# Patient Record
Sex: FEMALE | Race: WHITE | NOT HISPANIC OR LATINO | Employment: OTHER | URBAN - METROPOLITAN AREA
[De-identification: names, ages, dates, MRNs, and addresses within clinical notes are randomized per-mention and may not be internally consistent; named-entity substitution may affect disease eponyms.]

---

## 2017-06-24 ENCOUNTER — COMMUNICATION - HEALTHEAST (OUTPATIENT)
Dept: FAMILY MEDICINE | Facility: CLINIC | Age: 37
End: 2017-06-24

## 2017-06-24 DIAGNOSIS — B00.9 HERPES SIMPLEX WITHOUT MENTION OF COMPLICATION: ICD-10-CM

## 2017-11-02 ENCOUNTER — OFFICE VISIT - HEALTHEAST (OUTPATIENT)
Dept: FAMILY MEDICINE | Facility: CLINIC | Age: 37
End: 2017-11-02

## 2017-11-02 DIAGNOSIS — Z01.810 PREOP CARDIOVASCULAR EXAM: ICD-10-CM

## 2017-11-02 ASSESSMENT — MIFFLIN-ST. JEOR: SCORE: 1250.56

## 2017-12-17 ENCOUNTER — COMMUNICATION - HEALTHEAST (OUTPATIENT)
Dept: FAMILY MEDICINE | Facility: CLINIC | Age: 37
End: 2017-12-17

## 2017-12-17 DIAGNOSIS — B00.9 HERPES SIMPLEX VIRUS (HSV) INFECTION: ICD-10-CM

## 2018-01-31 ENCOUNTER — OFFICE VISIT - HEALTHEAST (OUTPATIENT)
Dept: FAMILY MEDICINE | Facility: CLINIC | Age: 38
End: 2018-01-31

## 2018-01-31 DIAGNOSIS — Z00.00 HEALTH CARE MAINTENANCE: ICD-10-CM

## 2018-01-31 DIAGNOSIS — B00.9 HERPES SIMPLEX VIRUS (HSV) INFECTION: ICD-10-CM

## 2018-01-31 LAB
CHOLEST SERPL-MCNC: 174 MG/DL
FASTING STATUS PATIENT QL REPORTED: YES
HDLC SERPL-MCNC: 74 MG/DL
LDLC SERPL CALC-MCNC: 89 MG/DL
TRIGL SERPL-MCNC: 56 MG/DL

## 2018-01-31 ASSESSMENT — MIFFLIN-ST. JEOR: SCORE: 1277.77

## 2018-02-01 LAB
HPV SOURCE: NORMAL
HUMAN PAPILLOMA VIRUS 16 DNA: NEGATIVE
HUMAN PAPILLOMA VIRUS 18 DNA: NEGATIVE
HUMAN PAPILLOMA VIRUS FINAL DIAGNOSIS: NORMAL
HUMAN PAPILLOMA VIRUS OTHER HR: NEGATIVE
SPECIMEN DESCRIPTION: NORMAL

## 2019-08-16 ENCOUNTER — COMMUNICATION - HEALTHEAST (OUTPATIENT)
Dept: FAMILY MEDICINE | Facility: CLINIC | Age: 39
End: 2019-08-16

## 2019-08-16 DIAGNOSIS — B00.9 HERPES SIMPLEX VIRUS (HSV) INFECTION: ICD-10-CM

## 2019-09-26 ENCOUNTER — OFFICE VISIT - HEALTHEAST (OUTPATIENT)
Dept: FAMILY MEDICINE | Facility: CLINIC | Age: 39
End: 2019-09-26

## 2019-09-26 DIAGNOSIS — B00.9 HERPES SIMPLEX VIRUS (HSV) INFECTION: ICD-10-CM

## 2019-09-26 DIAGNOSIS — K60.2 RECTAL FISSURE: ICD-10-CM

## 2019-09-26 DIAGNOSIS — Z00.00 ANNUAL PHYSICAL EXAM: ICD-10-CM

## 2019-09-26 DIAGNOSIS — N84.1 CERVICAL POLYP: ICD-10-CM

## 2019-09-26 DIAGNOSIS — N92.0 EXCESSIVE AND FREQUENT MENSTRUATION: ICD-10-CM

## 2019-09-26 LAB
ALBUMIN SERPL-MCNC: 4.5 G/DL (ref 3.5–5)
ALP SERPL-CCNC: 61 U/L (ref 45–120)
ALT SERPL W P-5'-P-CCNC: 12 U/L (ref 0–45)
ANION GAP SERPL CALCULATED.3IONS-SCNC: 9 MMOL/L (ref 5–18)
AST SERPL W P-5'-P-CCNC: 17 U/L (ref 0–40)
BASOPHILS # BLD AUTO: 0 THOU/UL (ref 0–0.2)
BASOPHILS NFR BLD AUTO: 0 % (ref 0–2)
BILIRUB SERPL-MCNC: 1.1 MG/DL (ref 0–1)
BUN SERPL-MCNC: 12 MG/DL (ref 8–22)
CALCIUM SERPL-MCNC: 9.9 MG/DL (ref 8.5–10.5)
CHLORIDE BLD-SCNC: 103 MMOL/L (ref 98–107)
CLUE CELLS: NORMAL
CO2 SERPL-SCNC: 28 MMOL/L (ref 22–31)
CREAT SERPL-MCNC: 0.87 MG/DL (ref 0.6–1.1)
EOSINOPHIL # BLD AUTO: 0.1 THOU/UL (ref 0–0.4)
EOSINOPHIL NFR BLD AUTO: 1 % (ref 0–6)
ERYTHROCYTE [DISTWIDTH] IN BLOOD BY AUTOMATED COUNT: 11.8 % (ref 11–14.5)
GFR SERPL CREATININE-BSD FRML MDRD: >60 ML/MIN/1.73M2
GLUCOSE BLD-MCNC: 98 MG/DL (ref 70–125)
HCT VFR BLD AUTO: 38.9 % (ref 35–47)
HGB BLD-MCNC: 13.1 G/DL (ref 12–16)
LH SERPL-ACNC: 9.5 MIU/ML
LYMPHOCYTES # BLD AUTO: 1.8 THOU/UL (ref 0.8–4.4)
LYMPHOCYTES NFR BLD AUTO: 27 % (ref 20–40)
MCH RBC QN AUTO: 31 PG (ref 27–34)
MCHC RBC AUTO-ENTMCNC: 33.6 G/DL (ref 32–36)
MCV RBC AUTO: 92 FL (ref 80–100)
MONOCYTES # BLD AUTO: 0.3 THOU/UL (ref 0–0.9)
MONOCYTES NFR BLD AUTO: 5 % (ref 2–10)
NEUTROPHILS # BLD AUTO: 4.3 THOU/UL (ref 2–7.7)
NEUTROPHILS NFR BLD AUTO: 67 % (ref 50–70)
PLATELET # BLD AUTO: 232 THOU/UL (ref 140–440)
PMV BLD AUTO: 8.1 FL (ref 7–10)
POTASSIUM BLD-SCNC: 3.9 MMOL/L (ref 3.5–5)
PROLACTIN SERPL-MCNC: 5.7 NG/ML (ref 0–20)
PROT SERPL-MCNC: 8 G/DL (ref 6–8)
RBC # BLD AUTO: 4.22 MILL/UL (ref 3.8–5.4)
SODIUM SERPL-SCNC: 140 MMOL/L (ref 136–145)
TRICHOMONAS, WET PREP: NORMAL
TSH SERPL DL<=0.005 MIU/L-ACNC: 0.85 UIU/ML (ref 0.3–5)
WBC: 6.5 THOU/UL (ref 4–11)
YEAST, WET PREP: NORMAL

## 2019-09-26 ASSESSMENT — MIFFLIN-ST. JEOR: SCORE: 1272.62

## 2019-09-27 LAB
C TRACH DNA SPEC QL PROBE+SIG AMP: NEGATIVE
N GONORRHOEA DNA SPEC QL NAA+PROBE: NEGATIVE

## 2019-10-01 ENCOUNTER — RECORDS - HEALTHEAST (OUTPATIENT)
Dept: ADMINISTRATIVE | Facility: OTHER | Age: 39
End: 2019-10-01
Payer: COMMERCIAL

## 2020-01-15 ENCOUNTER — HOSPITAL ENCOUNTER (OUTPATIENT)
Dept: MAMMOGRAPHY | Facility: CLINIC | Age: 40
Discharge: HOME | End: 2020-01-15

## 2020-01-15 DIAGNOSIS — Z00.00 ANNUAL PHYSICAL EXAM: ICD-10-CM

## 2020-12-14 ENCOUNTER — OFFICE VISIT - HEALTHEAST (OUTPATIENT)
Dept: FAMILY MEDICINE | Facility: CLINIC | Age: 40
End: 2020-12-14

## 2020-12-14 ENCOUNTER — COMMUNICATION - HEALTHEAST (OUTPATIENT)
Dept: SCHEDULING | Facility: CLINIC | Age: 40
End: 2020-12-14

## 2020-12-14 DIAGNOSIS — K13.79 MOUTH PAIN: ICD-10-CM

## 2020-12-14 DIAGNOSIS — J02.9 PHARYNGITIS, UNSPECIFIED ETIOLOGY: ICD-10-CM

## 2020-12-14 LAB
DEPRECATED S PYO AG THROAT QL EIA: NORMAL
GROUP A STREP BY PCR: NORMAL

## 2020-12-14 RX ORDER — NYSTATIN 100000/ML
SUSPENSION, ORAL (FINAL DOSE FORM) ORAL
Qty: 200 ML | Refills: 0 | Status: SHIPPED | OUTPATIENT
Start: 2020-12-14 | End: 2022-08-17

## 2020-12-16 ENCOUNTER — COMMUNICATION - HEALTHEAST (OUTPATIENT)
Dept: SCHEDULING | Facility: CLINIC | Age: 40
End: 2020-12-16

## 2021-03-02 ENCOUNTER — HOSPITAL ENCOUNTER (OUTPATIENT)
Dept: MAMMOGRAPHY | Facility: CLINIC | Age: 41
Discharge: HOME | End: 2021-03-02

## 2021-03-02 DIAGNOSIS — Z12.31 SCREENING MAMMOGRAM, ENCOUNTER FOR: ICD-10-CM

## 2021-05-19 ENCOUNTER — COMMUNICATION - HEALTHEAST (OUTPATIENT)
Dept: FAMILY MEDICINE | Facility: CLINIC | Age: 41
End: 2021-05-19

## 2021-05-19 DIAGNOSIS — B00.9 HERPES SIMPLEX VIRUS (HSV) INFECTION: ICD-10-CM

## 2021-05-20 RX ORDER — ACYCLOVIR 400 MG/1
TABLET ORAL
Qty: 15 TABLET | Refills: 6 | Status: SHIPPED | OUTPATIENT
Start: 2021-05-20

## 2021-05-30 ENCOUNTER — HEALTH MAINTENANCE LETTER (OUTPATIENT)
Age: 41
End: 2021-05-30

## 2021-05-31 VITALS — BODY MASS INDEX: 21.66 KG/M2 | WEIGHT: 130 LBS | HEIGHT: 65 IN

## 2021-05-31 VITALS — WEIGHT: 136 LBS | HEIGHT: 65 IN | BODY MASS INDEX: 22.66 KG/M2

## 2021-05-31 NOTE — TELEPHONE ENCOUNTER
Refill Request  Did you contact pharmacy: No  Medication name:   Requested Prescriptions     Pending Prescriptions Disp Refills     acyclovir (ZOVIRAX) 400 MG tablet 15 tablet 1     Sig: TAKE 1 TABLET BY MOUTH THREE TIMES DAILY     Who prescribed the medication: Skylar Nicholas MD    Pharmacy Name and Location: [facility] # [id number]  Is patient out of medication: Yes  Patient notified refills processed in 72 hours:  yes  Okay to leave a detailed message: yes    Patient is having an outbreak and would appreciate this be review today. Patient was informed is past due for office visit. Patient will call back once has schedule.

## 2021-06-01 NOTE — PROGRESS NOTES
FEMALE ADULT PREVENTIVE EXAM    ASSESSMENT:   Iza Pichardo  was seen today for annual exam.  1. Annual physical exam  Mammo Screening Bilateral    Influenza,Seasonal,Quad,INJ =/>6months   2. Herpes simplex virus (HSV) infection  acyclovir (ZOVIRAX) 400 MG tablet   3. Excessive and frequent menstruation  Comprehensive Metabolic Panel    HM1(CBC and Differential)    Thyroid Cascade    Luteinizing Hormone (LH)    Prolactin    HM1 (CBC with Diff)    Chlamydia trachomatis & Neisseria gonorrhoeae, Amplified Detection    Wet Prep, Vaginal    CANCELED: US Pelvis With Transvaginal Non OB   4. Cervical polyp  Ambulatory referral to Obstetrics / Gynecology   5. Rectal fissure         PLAN:   Discussed work-up for cervical polyp.  Discussed careful anal fissure and further work-up if it is persisting.  Continue current healthy lifestyle patterns and return for routine annual checkups      CHIEF COMPLAINT:      Chief Complaint   Patient presents with     Establish Care     Pt here today to establish care with a new provider- former Pt of Dr Nicholas     Annual Exam     Not Fasting, Last Pap 1/31/18         SUBJECTIVE:  Iza Pichardo is a 39 y.o. female   presents today for an annual physical examination.     Patient reports frequent menstruation.  She said her cycles are now every 3 weeks or sometimes even less.  The first day of the.  Is always heavy.  She denies excessive cramping or abdominal pain.  LMP Just finished. 2 days 09/19/2019, 09/02/2019 every 2 -3 weeks for last 6 months No intermentrual spotting. Fiirst day is heavy with some small clots.    She would also like to be evaluated for what she feels is a sore on the anal area.  This sore has been present for couple of weeks.  She does have a history of HSV with perhaps one flare up once a year.      Iza Pichardo  has a past medical history  has a past medical history of Inverted nipple (all life.).        Surgeries:      Past Surgical History:    Procedure Laterality Date     BREAST SURGERY  11/2017    bilateral augmentation; silicone     NOSE SURGERY      cosmetic     IA CORRJ HALLUX VALGUS W/SESMDC W/1METAR MEDIAL CNF      Description: Bunion Correction By Lapidus Procedure;  Proc Date: 04/05/2013;  Comments: R Lapidus bunionectomy, [employer] hardware     IA CORRJ HALLUX VALGUS W/SESMDC W/DIST METAR OSTEOT      Description: Bunion Correction With Metatarsal Osteotomy;  Recorded: 03/27/2013;  Comments: '12 (left)         Family History:  family history includes Brain cancer in her mother; Breast cancer in her paternal grandmother; Breast cancer (age of onset: 50) in her paternal aunt; Depression in her sister; Drug abuse in her sister.    Social History:   reports that she has never smoked. She has never used smokeless tobacco.    Medications:    Current Outpatient Medications on File Prior to Visit   Medication Sig Dispense Refill     calcium 500 mg Tab Take 500 mg by mouth.       [DISCONTINUED] acyclovir (ZOVIRAX) 400 MG tablet TAKE 1 TABLET BY MOUTH THREE TIMES DAILY 15 tablet 1     No current facility-administered medications on file prior to visit.          Allergies:    Allergies   Allergen Reactions     Amoxicillin Rash     Augmentin [Amoxicillin-Pot Clavulanate] Nausea And Vomiting         HEALTH MAINTENANCE:  Pap- 2018 NORMAL  Mammography: 2015 NORMAL    Healthy Habits:   Regular Exercise: No  Sunscreen Use: Yes  Healthy Diet: Yes  Dental Visits Regularly: No  Seat Belt: Yes  Sexually active: Yes  Self Breast Exam Monthly:Yes  Hemoccults: No  Flex Sig: No  Colonoscopy: No  Lipid Profile: Yes  Glucose Screen: Yes  Prevention of Osteoporosis: Yes  Last Dexa: N/A  Guns at Home:  No      REVIEW OF SYSTEMS:  Complete head to toe review of systems is otherwise negative except as above.  Iza SHI Charter denies any fever, cough, loss of appetite, heart issues, GI issues, new skin lesions, endocrine issues.  She informs me she feels  "well.      OBJECTIVE:  VITAL SIGNS: /75 (Patient Site: Left Arm, Patient Position: Sitting, Cuff Size: Adult Regular)   Pulse 78   Resp 16   Ht 5' 4.96\" (1.65 m)   Wt 135 lb (61.2 kg)   LMP 09/21/2019 (Approximate)   SpO2 100%   BMI 22.49 kg/m      GENERAL:  Patient alert, in no acute distress.  EYES: PERRLA. Extraoccular movements intact, pupils equal, reactive to light and accommodation.  Normal conjunctiva and lids.  ENT:  Hearing grossly normal.  Normal appearance to ears and nose.  Bilateral TM s, external canals, oropharynx normal. Normal lips, gums and teeth.   NECK:  Supple, without thyromegaly or mass.  RESP:  Clear to auscultation without crackles, wheezes or distress.  Normal respiratory effort.   CV:  Regular rate and rhythm without murmurs, rubs or gallops.  Normal carotid, abdominal aorta, femoral and pedal pulses.  No varicosities or edema.  ABDOMEN:  Soft, non-tender, without hepatosplenomegaly, masses, or hernias.   BREASTS:  Nontender, without masses, nipple discharge, erythema, or axillary adenopathy.    PELVIC EXAMINATION:  EXT GEN: No lesions.   PERINEUM: Intact. No perineal or external anal lesions.  URETHRA: No meatal lesions, prolapse. No urethral          tenderness or masses  BLADDER: Nontender, no masses  INTROITUS: Well supported. No lesions or other abnormalities  VAGINA: Clean, no bleeding  CERVIX: Normal appearing epithelium. Parous.  Cervical polyp noted at the os.  UTERUS: Nl in size, shape and consistency   ADNEXA: Clear, non tender  Rectal area: There is a anal fissure noted at the o'clock position.  LYMPHATIC: Normal palpation of neck, and axilla..  No lymphadenopathy.   NEURO:   motor & sensory function all intact.  DTR and reflexes normal.  PSYCHIATRIC:  Alert & oriented with normal mood and affect.  Good judgment and insight.  SKIN:  Normal inspection and palpation.  MUSCULOSKELETAL: Normal gait and station.      "

## 2021-06-02 ENCOUNTER — RECORDS - HEALTHEAST (OUTPATIENT)
Dept: ADMINISTRATIVE | Facility: CLINIC | Age: 41
End: 2021-06-02

## 2021-06-03 VITALS
WEIGHT: 135 LBS | RESPIRATION RATE: 16 BRPM | HEIGHT: 65 IN | OXYGEN SATURATION: 100 % | BODY MASS INDEX: 22.49 KG/M2 | DIASTOLIC BLOOD PRESSURE: 75 MMHG | HEART RATE: 78 BPM | SYSTOLIC BLOOD PRESSURE: 114 MMHG

## 2021-06-05 VITALS
BODY MASS INDEX: 23.92 KG/M2 | SYSTOLIC BLOOD PRESSURE: 116 MMHG | WEIGHT: 143.6 LBS | RESPIRATION RATE: 16 BRPM | HEART RATE: 77 BPM | TEMPERATURE: 98.5 F | DIASTOLIC BLOOD PRESSURE: 77 MMHG

## 2021-06-13 NOTE — TELEPHONE ENCOUNTER
Tongue slightly swollen and has ridges on it  And is sore/no known injury/ last week had a sore throat that went away/ 48 hours ago lips were numb/ but that went away/ using a new tooth paste and had deep pocket cleaning 11/28/ sent for an appointment or will go to the walk in.  NOÉ Ching    Reason for Disposition    [1] Swelling of tongue is a recurrent problem AND [2] no swelling at present    Additional Information    Negative: Started suddenly after sting from bee, wasp, or yellow jacket    Negative: Started suddenly after taking a medicine or allergic food (e.g., nuts)    Negative: Wheezing, stridor, hoarseness, or difficulty breathing    Negative: Facial swelling    Negative: Neck swelling    Negative: Can't swallow normal secretions (e.g., drooling or spitting)    Negative: Taking an ACE Inhibitor medication  (e.g., benazepril/LOTENSIN, captopril/CAPOTEN, enalapril/VASOTEC, lisinopril/ZESTRIL)    Negative: Sounds like a life-threatening emergency to the triager    Negative: Followed a tongue injury    Negative: Pain in tongue, mouth, or tooth    Negative: All other adults with swollen tongue   (Exception: tongue swelling is a recurrent problem AND NO swelling at present)    Protocols used: TONGUE SWELLING-A-

## 2021-06-13 NOTE — PROGRESS NOTES
Assessment:     Iza Pichardo was seen in preoperative consultation in preparation for rhinoplasty and breast augmentation. This is an intermediate risk surgery and the patient has no increased risk for major cardiac complications based on exam and history and appears to be medically stable for the proposed procedure.      37 y.o. female with planned surgery as above.    Known risk factors for perioperative complications: None    Difficulty with intubation is not anticipated.    Cardiac Risk Estimation: low risk.       Current medications which may produce withdrawal symptoms if withheld perioperatively: none       Plan:      1. Preoperative workup as follows hemoglobin, hematocrit, beta HCG.  2. Change in medication regimen before surgery: discontinue supplements 7 days prior to procedure. .  3. Prophylaxis for cardiac events with perioperative beta-blockers: not indicated.  4. Invasive hemodynamic monitoring perioperatively: not indicated.  5. Deep vein thrombosis prophylaxis postoperatively:regimen to be chosen by surgical team.  6. Surveillance for postoperative MI with ECG immediately postoperatively and on postoperative days 1 and 2 AND troponin levels 24 hours postoperatively and on day 4 or hospital discharge (whichever comes first): not indicated.  7. Other measures: none      Subjective:      Iza Pichardo is a 37 y.o. female who presents to the office today for a preoperative consultation at the request of surgeon Dr. Pope who plans on performing rhinoplasty and breast augmentation on November 8. This consultation is requested for the specific conditions prompting preoperative evaluation (i.e. because of potential affect on operative risk):none. Planned anesthesia: general. The patient has the following known anesthesia issues: nonwe. Patients bleeding risk: no recent abnormal bleeding and no remote history of abnormal bleeding. Patient does not have objections to receiving blood products if  needed.    History of present illness: Patient here for preoperative evaluation for breast augmentation rhinoplasty.  Has had a previous breast augmentation several years ago.  This went well.  She is a little bit apprehensive about surgery, however looking forward to it.  No history of cardiac problems.  Has tolerated previous procedures with anesthesia well and without reaction.  No bleeding disorders that she is aware of in herself or her family members.  No recent illness, fever, chills, shortness of breath, palpitations or chest pain.    The following portions of the patient's history were reviewed and updated as appropriate: allergies, current medications, past family history, past medical history, past social history, past surgical history and problem list.    Review of Systems  A 12 point comprehensive review of systems was negative except as noted.      Objective:     There were no vitals taken for this visit.  General: Patient appears to be in no acute distress.  Eyes: Inferior palpebral conjunctiva clear and pink, no exudates or tearing. Sclera are white. Eyelids symmetrical, no erythema, flakiness, fasciculations, or ptosis. Pupils react equally to Light and accommodation. EOMS intact. No lid lag, no nystagmus, corneal reflex equal bilaterally, cornea and lens clear, red reflex present, optic disc cream colored with well defined borders. Retina pink, no hemorrhages or exudates.  Ears: No nodules, lesions, masses, discharge or tenderness in auricles or mastoid area. No cerumen in ear canals. Tympanic membranes pearly gray, normal bony landmarks and cone of light bilaterally, no bulges or perforations.   Oropharynx: Buccal mucosa pink, moist, no lesions. Tongue midline, spongy, pink. Uvula midline. Pharynx with no erythema, no exudates. Tonsils + 1.  Neck: Full range of motion, no pain with palpation of spine or paraspinal muscles.  Lungs: Unlabored. clear breath sounds heard throughout lung fields.    Heart: Regular rate and rhythm.  Abdomen: Soft rounded abdomen, no surgical scars. Bowel sounds heard in all four quadrants; liver, spleen, and kidney not palpable, no tenderness on palpation of  abdomen, no CVA tenderness.    Musculoskeletal: muscles appear symmetric, muscle strength appropriate (Bilateral upper and lower extremities strength 5/5) and equal bilaterally full range of active and passive motion, spine and extremities in good alignment.  Neuro: Coordinated, smooth gait, balance, rapid alternating movements, sensory functioning, and cranial nerves II-XII grossly intact. DTR's+2 bilaterally brachioradialis, knee, ankle. Rhomberg s normal.        Predictors of intubation difficulty:   Morbid obesity? no   Anatomically abnormal facies? no   Prominent incisors? no   Receding mandible? no   Short, thick neck? no   Neck range of motion: normal   Mallampati score: I (soft palate, uvula, fauces, and tonsillar pillars visible)   Dentition: No chipped, loose, or missing teeth.    Cardiographics  ECG: no prior ECG  Echocardiogram: not done    Imaging  Chest x-ray: Not done     Lab Review   Lab Results   Component Value Date     03/27/2013    K 4.1 03/27/2013     03/27/2013    CO2 24 03/27/2013    BUN 13 03/27/2013    CREATININE 0.91 03/27/2013    CALCIUM 9.4 03/27/2013     Lab Results   Component Value Date    WBC 5.5 11/16/2015    HGB 12.9 11/02/2017    HCT 38.4 11/02/2017    MCV 94 11/16/2015     11/16/2015

## 2021-06-13 NOTE — PROGRESS NOTES
Assessment/ Plan     1. Pharyngitis, unspecified etiology  2. Mouth pain  Rule out strep.  Rule out COVID-19 infection  Etiology unclear.  Consider possible thrush versus other etiology    Rapid strep test is negative  Refer for a Covid 19 test  Recommend empiric treatment with nystatin suspension in the short-term  However, favor follow-up with an ENT specialist if having ongoing symptoms    - Symptomatic COVID-19 Virus (CORONAVIRUS) PCR; Future  - Rapid Strep A Screen- Throat Swab  - Symptomatic COVID-19 Virus (CORONAVIRUS) PCR  - Group A Strep PCR Throat Swab  - nystatin (MYCOSTATIN) 100,000 unit/mL suspension; Use 5 mls PO four times a day. Swish and spit  Dispense: 200 mL; Refill: 0        Subjective:       Iza Pichardo is a 40 y.o. female, patient of Dr. Crowe, who presents to the clinic as she has had a constellation of symptoms over the past several days.  She reports a sore throat and has had some discomfort involving her tongue.  She states that her lips actually feel numb.  She has had a coating on her tongue and cheeks as well.  She has not had this before.  Her sense of taste is diminished.  Her throat has been sore.  She cannot think of any unusual exposures recently.  She denies fever, chest pain, respiratory concerns.    The following portions of the patient's history were reviewed and updated as appropriate: allergies, current medications, past family history, past medical history, past social history, past surgical history and problem list. Medications have been reconciled    Review of Systems   A 12 point comprehensive review of systems was negative except as noted.      Current Outpatient Medications   Medication Sig Dispense Refill     acyclovir (ZOVIRAX) 400 MG tablet TAKE 1 TABLET BY MOUTH THREE TIMES DAILY 15 tablet 6     calcium 500 mg Tab Take 500 mg by mouth.       nystatin (MYCOSTATIN) 100,000 unit/mL suspension Use 5 mls PO four times a day. Swish and spit 200 mL 0     No  current facility-administered medications for this visit.        Objective:      /77   Pulse 77   Temp 98.5  F (36.9  C) (Oral)   Resp 16   Wt 143 lb 9.6 oz (65.1 kg)   BMI 23.92 kg/m      General appearance: alert, appears stated age   Head: normocephalic, without obvious abnormality, atraumatic  Eyes: conjunctivae/corneas clear. PERRL, EOM's intact.   Ears: normal TM's and external ear canals both ears  Nose: nares normal. Septum midline. Mucosa normal.  Throat: lips, mucosa, and tongue normal; teeth and gums normal  Oropharynx is mildly erythematous  There is a whitish coating on the tongue and this involves the cheeks  Neck: no adenopathy, supple, symmetrical, trachea midline and thyroid not enlarged, symmetric   Lungs: clear to auscultation bilaterally  Heart: regular rate and rhythm, S1, S2 normal, no murmur, click, rub or gallop  Lymph nodes: Cervical nodes normal.  Neurologic: Alert and oriented X 3           Recent Results (from the past 168 hour(s))   Rapid Strep A Screen- Throat Swab    Specimen: Throat   Result Value Ref Range    Rapid Strep A Antigen No Group A Strep detected, presumptive negative No Group A Strep detected, presumptive negative   COVID-19 Virus PCR MRF    Specimen: Respiratory   Result Value Ref Range    COVID-19 VIRUS SPECIMEN SOURCE Nasopharyngeal     2019-nCOV Not Detected    Group A Strep PCR Throat Swab    Specimen: Throat   Result Value Ref Range    Group Strep A by PCR No Group A Strep detected No Group A Strep detected, Invalid, ERROR          This note has been dictated using voice recognition software. Any grammatical or context distortions are unintentional and inherent to the software    Kenneth Day MD

## 2021-06-15 NOTE — PROGRESS NOTES
Assessment/Plan:    1. Health care maintenance  Immunizations reviewed --- seasonal flu shot today, otherwise up-to-date  Mammography done preoperatively, recommend routine screening at age 40 given family history.  Pap reviewed--distant h/o abnormal (ASCUS); repeat today w/ HPV  Routine Dental and Eye care recommended  Discussed importance of regular exercise and appropriate calcium intake  Discussed Advance Directives--given copy of honoring choices to complete    - Gynecologic Cytology (PAP Smear)  - Lipid [place] FASTING    2. Herpes simplex virus (HSV) infection  Okay refill of medication for acute outbreaks.  Follow-up if increasing outbreaks.  - acyclovir (ZOVIRAX) 400 MG tablet; TAKE 1 TABLET BY MOUTH THREE TIMES DAILY  Dispense: 15 tablet; Refill: 1    Pt states an understanding and agrees with the above plan.    I have had an Advance Directives discussion with the patient.            Health Maintenance   Topic Date Due     ADVANCE DIRECTIVES DISCUSSED WITH PATIENT  08/09/1996     PAP SMEAR  07/25/2017     INFLUENZA VACCINE RULE BASED (1) 08/01/2017     TD 18+ HE  09/02/2021     TDAP ADULT ONE TIME DOSE  Completed         HPI    Patient is a 37 y.o. female presents for a physical exam.  She is fasting today for labs.  No other specific concerns.  Patient is sexually active.  Her  had a vasectomy.  She reports regular menses, no discharge, no vaginal lesions or irritation.  Since I last seen her, patient had breast augmentation surgery.  This was done in the November of last year.  Surgical history is updated.  Patient reports no complications postoperatively.  She is due next month for her 3 month follow-up with the surgeon.  Overall, patient reports mood to be good.  Both PHQ 9 and DAVID 7 screening are normal.  She feels stressors are improved over previous with less financial concerns, more stable marriage, healthy children.    The following portions of the patient's history were reviewed and  "updated as appropriate: allergies, current medications, past family history, past medical history, past social history, past surgical history and problem list.    Review of Systems  Pertinent items are noted in HPI.  A 12 point comprehensive review of systems was negative except as noted.    Immunization History   Administered Date(s) Administered     DT (pediatric) 01/01/2000     HPV Quadrivalent 02/12/2008, 04/15/2008, 08/12/2008     Hep A, historic 02/12/2008, 08/12/2008     Influenza, seasonal,quad inj 6-35 mos 09/29/2010, 09/02/2011, 09/05/2012, 12/16/2014     Td,adult,historic,unspecified 01/01/2000     Tdap 09/02/2011     No results found for this or any previous visit (from the past 240 hour(s)).    Patient Active Problem List   Diagnosis     Herpes Simplex     Family History   Problem Relation Age of Onset     Breast cancer Paternal Grandmother      Breast cancer Paternal Aunt 50     Brain cancer Mother      told not hereditary     Drug abuse Sister      Depression Sister      Social History     Social History     Marital status:      Spouse name: N/A     Number of children: N/A     Years of education: N/A     Occupational History     Not on file.     Social History Main Topics     Smoking status: Never Smoker     Smokeless tobacco: Never Used     Alcohol use Not on file     Drug use: Not on file     Sexual activity: Not on file     Other Topics Concern     Not on file     Social History Narrative       Objective:    /58 (Patient Site: Left Arm, Patient Position: Sitting, Cuff Size: Adult Regular)  Pulse 72  Temp 98.2  F (36.8  C) (Oral)   Resp 14  Ht 5' 5\" (1.651 m)  Wt 136 lb (61.7 kg)  LMP 01/10/2018  BMI 22.63 kg/m2     PHQ 9= 2  DAVID 7 = 1      General Appearance:    Alert, cooperative, no distress, appears stated age   Head:    Normocephalic, without obvious abnormality, atraumatic   Eyes:    PERRL, conjunctiva/corneas clear, EOM's intact both eyes   Ears:    Normal TM's and " external ear canals, both ears   Nose:   Nares normal, septum midline, mucosa normal   Throat:   Lips, mucosa, and tongue normal; teeth and gums normal   Neck:   Supple, symmetrical, trachea midline, no adenopathy;     thyroid:  no enlargement/tenderness/nodules; no carotid    bruit or JVD   Back:     Symmetric, no curvature, no CVA tenderness   Lungs:     Clear to auscultation bilaterally, respirations unlabored   Chest Wall:    No tenderness or deformity    Heart:    Regular rate and rhythm, S1 and S2 normal, no murmur, rub   or gallop   Breast Exam:    No tenderness, masses, or nipple abnormality   Abdomen:     Soft, non-tender, bowel sounds active all four quadrants,     no masses, no organomegaly   Genitalia:   Normal external female genitalia.  Speculum isolate cervix with no gross abnormalities.  Pap collected without difficulty.  Bimanual exam shows normal midline uterus with no cervical motion tenderness.  No adnexal masses or tenderness could be appreciated   Rectal:   External rectal tissue   Extremities:   Extremities normal, atraumatic, no cyanosis or edema   Pulses:   2+ and symmetric all extremities   Skin:   Skin color, texture, turgor normal, no rashes or lesions       Neurologic:   CNII-XII intact

## 2021-06-16 PROBLEM — K60.2 RECTAL FISSURE: Status: ACTIVE | Noted: 2019-09-26

## 2021-06-16 PROBLEM — N84.1 CERVICAL POLYP: Status: ACTIVE | Noted: 2019-09-26

## 2021-06-17 NOTE — PATIENT INSTRUCTIONS - HE
Patient Instructions by Manuel Crowe MD at 9/26/2019 12:50 PM     Author: Manuel Crowe MD Service: -- Author Type: Physician    Filed: 9/26/2019  1:54 PM Encounter Date: 9/26/2019 Status: Addendum    : Manuel Crowe MD (Physician)    Related Notes: Original Note by Manuel Crowe MD (Physician) filed at 9/26/2019  1:39 PM       Patient Education     Heavy Menstrual Bleeding    Heavy menstrual bleeding means that your periods are heavier or longer than usual. You may soak through a pad or tampon every 1 to 3 hours on the heaviest days of your period. You may also pass large, dark clots. And your periods may last longer than 7 days.  If you have heavy periods often, this can cause a problem called anemia. With anemia, your red blood cell count is too low. Red blood cells are needed because they help carry oxygen throughout your body. Severe anemia may cause you to look pale and feel weak or tired. You might also become short of breath easily.  There are many possible causes of heavy menstrual bleeding. Hormonal imbalance is the most common cause. Having benign growths in your uterus, such as fibroids or polyps, is another cause. Taking certain medicines or having certain health problems or bleeding disorders are also causes.  To treat heavy menstrual bleeding, your healthcare provider may prescribe medicines first. If these dont help, you may need further testing and treatments.  Home care  Medicines  If youre prescribed medicines, be sure to take them as directed.    To help control heavy bleeding, any of the following may be used:  ? Hormone therapy (this includes all methods of hormonal birth control such as pills, shots, cream, ring, patch, or hormone-releasing IUD)  ? Nonsteroidal anti-inflammatory drugs (NSAIDs), such as ibuprofen  ? Antifibrinolytic medicines, such as transexamic acid    To help treat anemia, iron supplements may be prescribed.            General care    Get plenty of  rest if you tire easily. Avoid heavy exertion.    To help relieve pain or cramping, try using a heating pad on the lower belly or back. A warm bath may also help.  Follow-up care  Follow up with your healthcare provider, or as advised.  When to seek medical advice  Call your healthcare provider right away if any of these occur:    Heavier bleeding (soaking 1 pad or tampon every hour for 3 hours)    Heavy bleeding that lasts longer than 1 week    Fever of 100.4 F (38 C) or higher, or as directed by your provider    Pain or cramping that gets worse instead of better    Signs of anemia such as pale skin, extreme fatigue or weakness, or shortness of breath    Dizziness or fainting  Date Last Reviewed: 10/1/2017    [id number] [employer]. [address] [place] [address], [place] [address]. All rights reserved. This information is not intended as a substitute for professional medical care. Always follow your healthcare professional's instructions.         Anal Fissure    What is it?    An anal fissure is a tear (usually small) in the skin that lines the anus, the opening through which stool passes out of the body.    What causes it?    Any trauma which stretches or puts stress on the end of the anal canal, such as passage of large, hard stool, straining with a bowel movement, or explosive diarrhea can cause a tear. Sometimes people who have other medical conditions, such as Crohns disease, may develop fissures as part of the overall disease.    What are the symptoms?    Rectal pain during a bowel movement and sometimes for a period of time after the bowel movement.     Bright red blood on the toilet paper or in the toilet.     A feeling of itching or irritation around the anus.    A visible crack in the skin (may look like a little paper cut).    If the fissure becomes more chronic, there may still be pain, but no bleeding.    How is it diagnosed?    An anal fissure is diagnosed based on the symptoms described above  and on physical exam, looking for direct visual confirmation of the fissure.     How is it treated?    A new fissure frequently heals on its own and does not require treatment.  More chronic fissures require treatment which may include eliminating constipation, softening stool and decreasing anal spasm. These treatments include:    Increasing dietary fiber to 25-35 Gm daily.     Adding a daily fiber supplement    Using laxatives (such as Miralax) and/or stool softeners to relieve straining and hard stool.    Sitz baths (sitting in a few inches of warm water) for about 15min. 2-3 x daily until fissure is healed. This also decreases discomfort.    More resistant fissures might require medical treatment with topical medications that reduce the internal and external sphincter pressure, such as nitroglycerine, diltiazem or nifedipine. These are made into an ointment and a small drop is applied to the fissure and around the anal opening, 2-3 times each day.    If the fissure persists despite medical treatment, a referral to a colorectal surgeon may be needed.    When to seek medical advice.    If you experience rectal pain during or after bowel movements and/or you are seeing bright red blood on the toilet paper or in the toilet.    Additional resources    [email or web address]

## 2021-06-17 NOTE — TELEPHONE ENCOUNTER
RN cannot approve Refill Request    RN can NOT refill this medication PCP messaged that patient is overdue for Labs. Last office visit: Visit date not found Last Physical: 9/26/2019 Last MTM visit: Visit date not found Last visit same specialty: 12/14/2020 Kenneth Day MD.  Next visit within 3 mo: Visit date not found  Next physical within 3 mo: Visit date not found      Joelle Oneil, [facility] Triage/Med Refill 5/19/2021    Requested Prescriptions   Pending Prescriptions Disp Refills     acyclovir (ZOVIRAX) 400 MG tablet 15 tablet 6     Sig: TAKE 1 TABLET BY MOUTH THREE TIMES DAILY       Antivirals Refill Protocol Failed - 5/19/2021 11:26 AM        Failed - Renal function done in last year     Creatinine   Date Value Ref Range Status   09/26/2019 0.87 0.60 - 1.10 mg/dL Final             Passed - Visit with PCP or prescribing provider visit in past 12 months or next 3 months     Last office visit with prescriber/PCP: Visit date not found OR same dept: 12/14/2020 Kenneth Day MD OR same specialty: 12/14/2020 Kenneth Day MD  Last physical: 9/26/2019 Last MTM visit: Visit date not found   Next visit within 3 mo: Visit date not found  Next physical within 3 mo: Visit date not found  Prescriber OR PCP: Manuel Crowe MD  Last diagnosis associated with med order: 1. Herpes simplex virus (HSV) infection  - acyclovir (ZOVIRAX) 400 MG tablet; TAKE 1 TABLET BY MOUTH THREE TIMES DAILY  Dispense: 15 tablet; Refill: 6    If protocol passes may refill for 12 months if within 3 months of last provider visit (or a total of 15 months).             Passed - Patient does not have active pregnancy episode        Passed - Patient has not had positive pregnancy test in last 280 days     Beta hCG Qualitative   Date Value Ref Range Status   11/02/2017 Negative Negative Final     Pregnancy Test, Urine   Date Value Ref Range Status   11/16/2015 Negative Negative Final

## 2021-06-17 NOTE — TELEPHONE ENCOUNTER
Requested Prescriptions     Pending Prescriptions Disp Refills     acyclovir (ZOVIRAX) 400 MG tablet 15 tablet 6     Sig: TAKE 1 TABLET BY MOUTH THREE TIMES DAILY

## 2021-09-19 ENCOUNTER — HEALTH MAINTENANCE LETTER (OUTPATIENT)
Age: 41
End: 2021-09-19

## 2022-05-23 ENCOUNTER — ANCILLARY PROCEDURE (OUTPATIENT)
Dept: MAMMOGRAPHY | Facility: CLINIC | Age: 42
End: 2022-05-23
Attending: FAMILY MEDICINE
Payer: COMMERCIAL

## 2022-05-23 DIAGNOSIS — Z12.31 VISIT FOR SCREENING MAMMOGRAM: ICD-10-CM

## 2022-05-23 PROCEDURE — 77067 SCR MAMMO BI INCL CAD: CPT

## 2022-06-26 ENCOUNTER — HEALTH MAINTENANCE LETTER (OUTPATIENT)
Age: 42
End: 2022-06-26

## 2022-08-14 ASSESSMENT — ENCOUNTER SYMPTOMS
PARESTHESIAS: 0
WEAKNESS: 0
DYSURIA: 0
EYE PAIN: 0
SHORTNESS OF BREATH: 0
ABDOMINAL PAIN: 0
HEARTBURN: 0
PALPITATIONS: 0
FREQUENCY: 0
NERVOUS/ANXIOUS: 0
CHILLS: 0
HEMATURIA: 0
COUGH: 0
HEADACHES: 0
FEVER: 0
ARTHRALGIAS: 1
NAUSEA: 0
CONSTIPATION: 0
DIARRHEA: 0
SORE THROAT: 0
HEMATOCHEZIA: 0
BREAST MASS: 0
MYALGIAS: 0
JOINT SWELLING: 0
DIZZINESS: 0

## 2022-08-17 ENCOUNTER — OFFICE VISIT (OUTPATIENT)
Dept: FAMILY MEDICINE | Facility: CLINIC | Age: 42
End: 2022-08-17
Payer: COMMERCIAL

## 2022-08-17 VITALS
BODY MASS INDEX: 24.72 KG/M2 | DIASTOLIC BLOOD PRESSURE: 74 MMHG | HEART RATE: 90 BPM | WEIGHT: 148.4 LBS | SYSTOLIC BLOOD PRESSURE: 114 MMHG | HEIGHT: 65 IN | RESPIRATION RATE: 16 BRPM

## 2022-08-17 DIAGNOSIS — Z13.220 LIPID SCREENING: ICD-10-CM

## 2022-08-17 DIAGNOSIS — Z13.1 SCREENING FOR DIABETES MELLITUS: ICD-10-CM

## 2022-08-17 DIAGNOSIS — Z23 IMMUNIZATION DUE: ICD-10-CM

## 2022-08-17 DIAGNOSIS — Z12.4 CERVICAL CANCER SCREENING: ICD-10-CM

## 2022-08-17 DIAGNOSIS — Z00.00 ANNUAL PHYSICAL EXAM: ICD-10-CM

## 2022-08-17 DIAGNOSIS — N84.1 CERVICAL POLYP: ICD-10-CM

## 2022-08-17 DIAGNOSIS — N93.9 ABNORMAL UTERINE BLEEDING: ICD-10-CM

## 2022-08-17 DIAGNOSIS — M79.674 PAIN OF TOE OF RIGHT FOOT: Primary | ICD-10-CM

## 2022-08-17 DIAGNOSIS — Z11.59 ENCOUNTER FOR HEPATITIS C SCREENING TEST FOR LOW RISK PATIENT: ICD-10-CM

## 2022-08-17 PROBLEM — K60.2 RECTAL FISSURE: Status: RESOLVED | Noted: 2019-09-26 | Resolved: 2022-08-17

## 2022-08-17 LAB
ALBUMIN SERPL BCG-MCNC: 4.6 G/DL (ref 3.5–5.2)
ALP SERPL-CCNC: 72 U/L (ref 35–104)
ALT SERPL W P-5'-P-CCNC: 11 U/L (ref 10–35)
ANION GAP SERPL CALCULATED.3IONS-SCNC: 11 MMOL/L (ref 7–15)
AST SERPL W P-5'-P-CCNC: 19 U/L (ref 10–35)
BILIRUB SERPL-MCNC: 0.7 MG/DL
BUN SERPL-MCNC: 13.8 MG/DL (ref 6–20)
CALCIUM SERPL-MCNC: 9.3 MG/DL (ref 8.6–10)
CHLORIDE SERPL-SCNC: 99 MMOL/L (ref 98–107)
CHOLEST SERPL-MCNC: 208 MG/DL
CREAT SERPL-MCNC: 0.81 MG/DL (ref 0.51–0.95)
DEPRECATED HCO3 PLAS-SCNC: 27 MMOL/L (ref 22–29)
ERYTHROCYTE [DISTWIDTH] IN BLOOD BY AUTOMATED COUNT: 12.6 % (ref 10–15)
GFR SERPL CREATININE-BSD FRML MDRD: >90 ML/MIN/1.73M2
GLUCOSE SERPL-MCNC: 93 MG/DL (ref 70–99)
HCT VFR BLD AUTO: 37.7 % (ref 35–47)
HDLC SERPL-MCNC: 86 MG/DL
HGB BLD-MCNC: 12.2 G/DL (ref 11.7–15.7)
LDLC SERPL CALC-MCNC: 100 MG/DL
MCH RBC QN AUTO: 30.7 PG (ref 26.5–33)
MCHC RBC AUTO-ENTMCNC: 32.4 G/DL (ref 31.5–36.5)
MCV RBC AUTO: 95 FL (ref 78–100)
NONHDLC SERPL-MCNC: 122 MG/DL
PLATELET # BLD AUTO: 220 10E3/UL (ref 150–450)
POTASSIUM SERPL-SCNC: 4.4 MMOL/L (ref 3.4–5.3)
PROT SERPL-MCNC: 7 G/DL (ref 6.4–8.3)
RBC # BLD AUTO: 3.97 10E6/UL (ref 3.8–5.2)
SODIUM SERPL-SCNC: 137 MMOL/L (ref 136–145)
TRIGL SERPL-MCNC: 109 MG/DL
TSH SERPL DL<=0.005 MIU/L-ACNC: 0.97 UIU/ML (ref 0.3–4.2)
WBC # BLD AUTO: 5.4 10E3/UL (ref 4–11)

## 2022-08-17 PROCEDURE — 87624 HPV HI-RISK TYP POOLED RSLT: CPT | Performed by: FAMILY MEDICINE

## 2022-08-17 PROCEDURE — 36415 COLL VENOUS BLD VENIPUNCTURE: CPT | Performed by: FAMILY MEDICINE

## 2022-08-17 PROCEDURE — 85027 COMPLETE CBC AUTOMATED: CPT | Performed by: FAMILY MEDICINE

## 2022-08-17 PROCEDURE — 99396 PREV VISIT EST AGE 40-64: CPT | Mod: 25 | Performed by: FAMILY MEDICINE

## 2022-08-17 PROCEDURE — 90471 IMMUNIZATION ADMIN: CPT | Performed by: FAMILY MEDICINE

## 2022-08-17 PROCEDURE — 80061 LIPID PANEL: CPT | Performed by: FAMILY MEDICINE

## 2022-08-17 PROCEDURE — 88142 CYTOPATH C/V THIN LAYER: CPT | Performed by: FAMILY MEDICINE

## 2022-08-17 PROCEDURE — 86803 HEPATITIS C AB TEST: CPT | Performed by: FAMILY MEDICINE

## 2022-08-17 PROCEDURE — 80053 COMPREHEN METABOLIC PANEL: CPT | Performed by: FAMILY MEDICINE

## 2022-08-17 PROCEDURE — 84443 ASSAY THYROID STIM HORMONE: CPT | Performed by: FAMILY MEDICINE

## 2022-08-17 PROCEDURE — 99213 OFFICE O/P EST LOW 20 MIN: CPT | Mod: 25 | Performed by: FAMILY MEDICINE

## 2022-08-17 PROCEDURE — 90715 TDAP VACCINE 7 YRS/> IM: CPT | Performed by: FAMILY MEDICINE

## 2022-08-17 NOTE — PROGRESS NOTES
SUBJECTIVE:   CC: Iza Pichardo is an 42 year old woman who presents for preventive health visit.     Chief Complaint   Patient presents with     Foot Pain     Stubbed right foot toe on cement step 1 month ago. It still painful and has concerns with pins she has in foot.       HPI    TOE: prior bunionectomy, pins in foot. Stubbed middle toe on cement stair in garage. Ongoing pain, concerned about hx of pins in foot from bunion surgery. Range of motion limited from pins, uncomfortable but is able to move. Pain is now more prominent on the bottom when stepping or walking.     BLEEDING: Hx of cervical polyp. Periods are irregular. Had light period after 2 months without a period. 2-3 weeks later, then 4-6 weeks later heavy and lasted 2.5 weeks, fluctuating flow.     Today's PHQ-2 Score:   PHQ-2 ( 1999 [employer]) 8/14/2022   Q1: Little interest or pleasure in doing things 1   Q2: Feeling down, depressed or hopeless 1   PHQ-2 Score 2   Q1: Little interest or pleasure in doing things Several days   Q2: Feeling down, depressed or hopeless Several days   PHQ-2 Score 2       Abuse: Current or Past (Physical, Sexual or Emotional) - No  Do you feel safe in your environment? Yes        Social History     Tobacco Use     Smoking status: Never Smoker     Smokeless tobacco: Never Used   Substance Use Topics     Alcohol use: Not on file       Alcohol Use 8/14/2022   Prescreen: >3 drinks/day or >7 drinks/week? No       Reviewed orders with patient.  Reviewed health maintenance and updated orders accordingly - Yes      Breast Cancer Screening:    FHS-7:   Breast CA Risk Assessment (FHS-7) 5/23/2022 8/14/2022   Did any of your first-degree relatives have breast or ovarian cancer? No Unknown   Did any of your relatives have bilateral breast cancer? No Yes   Did any man in your family have breast cancer? No No   Did any woman in your family have breast and ovarian cancer? No No   Did any woman in your family have breast cancer  "before age 50 y? No Yes   Do you have 2 or more relatives with breast and/or ovarian cancer? Yes Yes   Do you have 2 or more relatives with breast and/or bowel cancer? No Unknown       Mammogram Screening - Offered annual screening and updated Health Maintenance for mutual plan based on risk factor consideration    Pertinent mammograms are reviewed under the imaging tab.    History of abnormal Pap smear: NO - age 30-65 PAP every 5 years with negative HPV co-testing recommended  PAP / HPV Latest Ref Rng & Units 1/31/2018   PAP - Negative for squamous intraepithelial lesion or malignancy  Electronically signed by Angélica Chapin CT (ASCP) on 2/6/2018 at  4:11 PM     HPV16 NEG Negative   HPV18 NEG Negative   HRHPV NEG Negative     Reviewed and updated as needed this visit by clinical staff   Tobacco  Allergies  Meds                Reviewed and updated as needed this visit by Provider   Tobacco  Allergies  Meds  Problems  Med Hx  Surg Hx  Fam Hx  Soc   Hx           Review of Systems  10 point ROS negative except for as reported above.      OBJECTIVE:   /74 (BP Location: Left arm, Patient Position: Sitting, Cuff Size: Adult Regular)   Pulse 90   Resp 16   Ht 1.648 m (5' 4.9\")   Wt 67.3 kg (148 lb 6.4 oz)   BMI 24.77 kg/m    Physical Exam  GENERAL: healthy, alert and no distress  EYES: Eyes grossly normal to inspection, PERRL and conjunctivae and sclerae normal  HENT: ear canals and TM's normal, nose and mouth without ulcers or lesions  NECK: no adenopathy, no asymmetry, masses, or scars and thyroid normal to palpation  RESP: lungs clear to auscultation - no rales, rhonchi or wheezes  BREAST: declined   CV: regular rate and rhythm, normal S1 S2, no S3 or S4, no murmur, click or rub, no peripheral edema and peripheral pulses strong  ABDOMEN: soft, nontender, no hepatosplenomegaly, no masses and bowel sounds normal   (female): normal female external genitalia, normal urethral meatus , vaginal mucosa " "pink, moist, well rugated and endocervical polyp present.  MS: no gross musculoskeletal defects noted, no edema  SKIN: no suspicious lesions or rashes  NEURO: Normal strength and tone, mentation intact and speech normal  PSYCH: mentation appears normal, affect normal/bright      ASSESSMENT/PLAN:     1. Annual physical exam  - REVIEW OF HEALTH MAINTENANCE PROTOCOL ORDERS    Reviewed health history and health maintenance recommendations.    2. Pain of toe of right foot  - XR Foot Right G/E 3 Views; Future    Foot injury several weeks ago.  X-ray normal per my review, waiting radiology review.    3. Abnormal uterine bleeding  - Pap Screen with HPV - recommended age 30 - 65 years  - CBC with platelets  - TSH with free T4 reflex    Periods are irregular, endocervical polyp present.  Await Pap smear results.  Also rule out thyroid disease and anemia.    4. Cervical cancer screening  - Pap Screen with HPV - recommended age 30 - 65 years    5. Encounter for hepatitis C screening test for low risk patient  - Hepatitis C Screen Reflex to HCV RNA Quant and Genotype    6. Lipid screening  - Lipid panel reflex to direct LDL Non-fasting    7. Screening for diabetes mellitus  - Comprehensive metabolic panel (BMP + Alb, Alk Phos, ALT, AST, Total. Bili, TP)    8. Immunization due  - TDAP VACCINE (Adacel, Boostrix)    9. Cervical polyp  Anticipate referral to OB/GYN for removal.  Awaiting Pap smear results.    Patient has been advised of split billing requirements and indicates understanding: Yes    COUNSELING:  Reviewed preventive health counseling, as reflected in patient instructions    Estimated body mass index is 24.77 kg/m  as calculated from the following:    Height as of this encounter: 1.648 m (5' 4.9\").    Weight as of this encounter: 67.3 kg (148 lb 6.4 oz).        She reports that she has never smoked. She has never used smokeless tobacco.      Counseling Resources:  ATP IV Guidelines  Pooled Cohorts Equation " Calculator  Breast Cancer Risk Calculator  BRCA-Related Cancer Risk Assessment: FHS-7 Tool  FRAX Risk Assessment  ICSI Preventive Guidelines  Dietary Guidelines for Americans, 2010  USDA's MyPlate  ASA Prophylaxis  Lung CA Screening    Jaye Stacy DO  [facility]

## 2022-08-18 LAB — HCV AB SERPL QL IA: NONREACTIVE

## 2022-08-19 NOTE — RESULT ENCOUNTER NOTE
The 10-year ASCVD risk score (Karlacitlaly CORBETT Jr., et al., 2013) is: 0.3%  Patient updated by [employer] message with lab results.       Jarad Couch,  Your lab results have returned and overall look pretty good.  Your total cholesterol level was slightly elevated.  Continue to work hard on heart healthy diet and regular physical activity to help control your cholesterol levels.  Please reach out via [employer] with any other questions or concerns.  Jaye Stacy, DO

## 2022-08-23 LAB
BKR LAB AP GYN ADEQUACY: NORMAL
BKR LAB AP GYN INTERPRETATION: NORMAL
BKR LAB AP HPV REFLEX: NORMAL
BKR LAB AP PREVIOUS ABNORMAL: NORMAL
PATH REPORT.COMMENTS IMP SPEC: NORMAL
PATH REPORT.COMMENTS IMP SPEC: NORMAL
PATH REPORT.RELEVANT HX SPEC: NORMAL

## 2022-08-24 LAB
HUMAN PAPILLOMA VIRUS 16 DNA: NEGATIVE
HUMAN PAPILLOMA VIRUS 18 DNA: NEGATIVE
HUMAN PAPILLOMA VIRUS FINAL DIAGNOSIS: NORMAL
HUMAN PAPILLOMA VIRUS OTHER HR: NEGATIVE

## 2022-11-21 ENCOUNTER — HEALTH MAINTENANCE LETTER (OUTPATIENT)
Age: 42
End: 2022-11-21

## 2023-06-02 ENCOUNTER — ANCILLARY PROCEDURE (OUTPATIENT)
Dept: MAMMOGRAPHY | Facility: CLINIC | Age: 43
End: 2023-06-02
Attending: FAMILY MEDICINE
Payer: COMMERCIAL

## 2023-06-02 DIAGNOSIS — Z12.31 VISIT FOR SCREENING MAMMOGRAM: ICD-10-CM

## 2023-06-02 PROCEDURE — 77067 SCR MAMMO BI INCL CAD: CPT

## 2023-06-23 ENCOUNTER — OFFICE VISIT (OUTPATIENT)
Dept: FAMILY MEDICINE | Facility: CLINIC | Age: 43
End: 2023-06-23
Payer: COMMERCIAL

## 2023-06-23 VITALS
RESPIRATION RATE: 16 BRPM | WEIGHT: 147.8 LBS | OXYGEN SATURATION: 97 % | DIASTOLIC BLOOD PRESSURE: 71 MMHG | HEART RATE: 76 BPM | TEMPERATURE: 98.1 F | HEIGHT: 65 IN | SYSTOLIC BLOOD PRESSURE: 103 MMHG | BODY MASS INDEX: 24.62 KG/M2

## 2023-06-23 DIAGNOSIS — N91.2 AMENORRHEA: Primary | ICD-10-CM

## 2023-06-23 LAB
FSH SERPL IRP2-ACNC: 126 MIU/ML
LH SERPL-ACNC: 80 MIU/ML
PROLACTIN SERPL 3RD IS-MCNC: 11 NG/ML (ref 5–23)
TSH SERPL DL<=0.005 MIU/L-ACNC: 1.28 UIU/ML (ref 0.3–4.2)

## 2023-06-23 PROCEDURE — 83002 ASSAY OF GONADOTROPIN (LH): CPT | Performed by: FAMILY MEDICINE

## 2023-06-23 PROCEDURE — 36415 COLL VENOUS BLD VENIPUNCTURE: CPT | Performed by: FAMILY MEDICINE

## 2023-06-23 PROCEDURE — 99213 OFFICE O/P EST LOW 20 MIN: CPT | Performed by: FAMILY MEDICINE

## 2023-06-23 PROCEDURE — 84146 ASSAY OF PROLACTIN: CPT | Performed by: FAMILY MEDICINE

## 2023-06-23 PROCEDURE — 83001 ASSAY OF GONADOTROPIN (FSH): CPT | Performed by: FAMILY MEDICINE

## 2023-06-23 PROCEDURE — 84443 ASSAY THYROID STIM HORMONE: CPT | Performed by: FAMILY MEDICINE

## 2023-06-23 NOTE — PROGRESS NOTES
Assessment & Plan     ICD-10-CM    1. Amenorrhea  N91.2 Follicle stimulating hormone     Luteinizing Hormone     TSH with free T4 reflex     Prolactin     Follicle stimulating hormone     Luteinizing Hormone     TSH with free T4 reflex     Prolactin        Patient with secondary amenorrhea now for about nearly a year.  Evaluation as above.  Patient is wondering if cervical polyp can cause amenorrhea-discussed that likely causes intermittent bleeding.  Lab work as above.  Consider imaging and referral to specialist after pursuing lab work.     MEDICATIONS:  Continue current medications without change  See Patient Instructions    Manuel Crowe MD  [facility]    Es Couch is a 42 year old, presenting for the following health issues:  Follow Up (Follow up on menstruation- haven't had a period on over a year. Low sex drive)        6/23/2023     8:06 AM   Additional Questions   Roomed by Elizabeth LOPEZ CMA     History of Present Illness       Reason for visit:  No period for almost a year, no sex drive, overall wellness    She eats 2-3 servings of fruits and vegetables daily.She consumes 1 sweetened beverage(s) daily.She exercises with enough effort to increase her heart rate 10 to 19 minutes per day.  She exercises with enough effort to increase her heart rate 3 or less days per week.   She is taking medications regularly.     Patient Active Problem List   Diagnosis     Hx of herpes simplex infection     Hx cervical polyp s/p removal     Current Outpatient Medications   Medication     calcium 500 mg Tab     acyclovir (ZOVIRAX) 400 MG tablet     No current facility-administered medications for this visit.         Review of Systems   Constitutional, HEENT, cardiovascular, pulmonary, gi and gu systems are negative, except as otherwise noted.      Objective    /71 (BP Location: Left arm, Patient Position: Sitting, Cuff Size: Adult Regular)   Pulse 76   Temp 98.1  F (36.7  C)  "(Oral)   Resp 16   Ht 1.648 m (5' 4.9\")   Wt 67 kg (147 lb 12.8 oz)   LMP 06/14/2022   SpO2 97%   BMI 24.67 kg/m    Body mass index is 24.67 kg/m .  Physical Exam   GENERAL: healthy, alert and no distress                    "

## 2023-07-18 ENCOUNTER — PATIENT OUTREACH (OUTPATIENT)
Dept: CARE COORDINATION | Facility: CLINIC | Age: 43
End: 2023-07-18
Payer: COMMERCIAL

## 2023-08-01 ENCOUNTER — PATIENT OUTREACH (OUTPATIENT)
Dept: CARE COORDINATION | Facility: CLINIC | Age: 43
End: 2023-08-01
Payer: COMMERCIAL

## 2023-08-01 ASSESSMENT — ANXIETY QUESTIONNAIRES
7. FEELING AFRAID AS IF SOMETHING AWFUL MIGHT HAPPEN: NOT AT ALL
GAD7 TOTAL SCORE: 2
6. BECOMING EASILY ANNOYED OR IRRITABLE: SEVERAL DAYS
3. WORRYING TOO MUCH ABOUT DIFFERENT THINGS: NOT AT ALL
5. BEING SO RESTLESS THAT IT IS HARD TO SIT STILL: NOT AT ALL
1. FEELING NERVOUS, ANXIOUS, OR ON EDGE: NOT AT ALL
4. TROUBLE RELAXING: SEVERAL DAYS
GAD7 TOTAL SCORE: 2
2. NOT BEING ABLE TO STOP OR CONTROL WORRYING: NOT AT ALL
IF YOU CHECKED OFF ANY PROBLEMS ON THIS QUESTIONNAIRE, HOW DIFFICULT HAVE THESE PROBLEMS MADE IT FOR YOU TO DO YOUR WORK, TAKE CARE OF THINGS AT HOME, OR GET ALONG WITH OTHER PEOPLE: SOMEWHAT DIFFICULT

## 2023-08-01 NOTE — PROGRESS NOTES
"SUBJECTIVE:  Iza Pichardo is a 42 year old female.  Chief Complaint   Patient presents with    Follow Up     Lab work, perimenopause     Iza was seen by Family Medicine MD on 23 for amenorrhea; she had no period since 2022 and had been having irregular periods for awhile before that. A couple of days ago she started spotting. Mostly light flow, spotting, and darker red in color, and lasted 5 days. No associated pain or craming. She did feel bloated. Partner has vasectomy for birth control. It is possible she had intercourse prior to the bleeding episode. Shares a hx of cervical polyp removal in 2019.    Her LH in the clinic on  was 80 (high) in a postmenopausal level.  FSH was also high at 126.  She had normal TSH and prolactin.  She has been having hot flashes, depressed mood, vaginal dryness, and decreased libido for some time. Unsure when her mother went through menopause. Her mother is .    Active diagnoses this visit:  Irregular periods  Spotting  Cervical polyp  Hx of cervical polypectomy  Hot flashes  Depressed mood    Review Of Systems: negative except that which is obtained in the HPI.    Medical, surgical, and family histories, medications and allergies reviewed and updated.    OBJECTIVE:  BP 90/66   Pulse 80   Ht 1.651 m (5' 5\")   Wt 68.4 kg (150 lb 12.8 oz)   LMP 2023   BMI 25.09 kg/m      Answers submitted by the patient for this visit:  DAVID-7 (Submitted on 2023)  DAVID 7 TOTAL SCORE: 2    PHQ-2 Score:         2023     4:46 PM 2023    12:21 PM   PHQ-2 (  [employer])   Q1: Little interest or pleasure in doing things 0 0   Q2: Feeling down, depressed or hopeless 1 1   PHQ-2 Score 1 1   Q1: Little interest or pleasure in doing things Not at all Not at all   Q2: Feeling down, depressed or hopeless Several days Several days   PHQ-2 Score 1 1        Exam:  Constitutional: healthy, alert, and no acute distress  Head: Normocephalic. No masses, lesions, " tenderness or abnormalities  Cardiovascular: Well perfused.  Respiratory: Breathing unlabored.  Gastrointestinal: Abdomen soft, non-tender. BS normal. No masses, organomegaly  : Normal external genitalia without lesions  Pelvic exam: normal vagina and vulva, scant amount white discharge present in vaginal vault, cervix multiparous with small polyp at 6 o'clock location, not actively bleeding, no CMT, uterus normal size anteverted, adenxa normal in size without tenderness. No bleeding noted with bimanual exam.  Musculoskeletal: extremities normal, no gross deformities noted, gait normal, and normal muscle tone  Skin: no suspicious lesions or rashes  Neurologic: Sensation grossly WNL.  Psychiatric: mentation appears normal and affect normal/bright    ASSESSMENT / PLAN:  (N92.6) Irregular periods  (primary encounter diagnosis)  (N92.0) Spotting  Comment: Discussed possible etiologies of recent spotting episode: presence of cervical polyp (especially if she notices the bleeding after intercourse), possible vaginal infection, vs irregular bleeding associated with the menopausal transition.  Plan: Chlamydia trachomatis/Neisseria gonorrhoeae by PCR - Clinic Collect, Wet prep - Clinic Collect         (N84.1) Cervical polyp  (Z98.890, Z87.42) Hx of cervical polypectomy  Plan: Discussed she keep track of bleeding and finding an association with intercourse, it could be due to the polyp. Would offer referral to OBGYN for removal if she desires.    (R23.2) Hot flashes  (R45.89) Depressed mood  Comment: Discussed menopausal transition and likely early menopause considering her bleeding hx at this point along with associated vasomotor symptoms. Discussed option for hormonal therapy vs selective serotonin reuptake inhibitor/SNRI therapy options. Discussed potential risks associated with hormonal therapy in combination with her family hx of breast cancer. She would like to avoid hormones at this time and is interested in  selective serotonin reuptake inhibitor therapy for vasomotor sxs and depressed mood. Offered Paxil vs Celexa and she would line to trial Celexa. Advised on how to take and possible SES. Encouraged she follow up in about 3 months.  Plan: citalopram (CELEXA) 20 MG tablet       45 minutes on the date of the encounter doing chart review, review of test results, interpretation of tests, patient visit, and documentation    ANDREW Soto, CHRISM

## 2023-08-02 ENCOUNTER — OFFICE VISIT (OUTPATIENT)
Dept: MIDWIFE SERVICES | Facility: CLINIC | Age: 43
End: 2023-08-02
Payer: COMMERCIAL

## 2023-08-02 VITALS
HEIGHT: 65 IN | SYSTOLIC BLOOD PRESSURE: 90 MMHG | BODY MASS INDEX: 25.12 KG/M2 | WEIGHT: 150.8 LBS | DIASTOLIC BLOOD PRESSURE: 66 MMHG | HEART RATE: 80 BPM

## 2023-08-02 DIAGNOSIS — N92.0 SPOTTING: ICD-10-CM

## 2023-08-02 DIAGNOSIS — R23.2 HOT FLASHES: ICD-10-CM

## 2023-08-02 DIAGNOSIS — R45.89 DEPRESSED MOOD: ICD-10-CM

## 2023-08-02 DIAGNOSIS — Z87.42 HX OF CERVICAL POLYPECTOMY: ICD-10-CM

## 2023-08-02 DIAGNOSIS — N84.1 CERVICAL POLYP: ICD-10-CM

## 2023-08-02 DIAGNOSIS — Z98.890 HX OF CERVICAL POLYPECTOMY: ICD-10-CM

## 2023-08-02 DIAGNOSIS — N92.6 IRREGULAR PERIODS: Primary | ICD-10-CM

## 2023-08-02 LAB
CLUE CELLS: PRESENT
TRICHOMONAS, WET PREP: ABNORMAL
WBC'S/HIGH POWER FIELD, WET PREP: ABNORMAL
YEAST, WET PREP: ABNORMAL

## 2023-08-02 PROCEDURE — 87591 N.GONORRHOEAE DNA AMP PROB: CPT | Performed by: ADVANCED PRACTICE MIDWIFE

## 2023-08-02 PROCEDURE — 99204 OFFICE O/P NEW MOD 45 MIN: CPT | Performed by: ADVANCED PRACTICE MIDWIFE

## 2023-08-02 PROCEDURE — 87491 CHLMYD TRACH DNA AMP PROBE: CPT | Performed by: ADVANCED PRACTICE MIDWIFE

## 2023-08-02 PROCEDURE — 87210 SMEAR WET MOUNT SALINE/INK: CPT | Performed by: ADVANCED PRACTICE MIDWIFE

## 2023-08-02 RX ORDER — CITALOPRAM HYDROBROMIDE 20 MG/1
20 TABLET ORAL DAILY
Qty: 60 TABLET | Refills: 2 | Status: SHIPPED | OUTPATIENT
Start: 2023-08-02 | End: 2024-09-05

## 2023-08-03 DIAGNOSIS — N76.0 BACTERIAL VAGINOSIS: Primary | ICD-10-CM

## 2023-08-03 DIAGNOSIS — B96.89 BACTERIAL VAGINOSIS: Primary | ICD-10-CM

## 2023-08-03 LAB
C TRACH DNA SPEC QL PROBE+SIG AMP: NEGATIVE
N GONORRHOEA DNA SPEC QL NAA+PROBE: NEGATIVE

## 2023-08-03 RX ORDER — METRONIDAZOLE 500 MG/1
500 TABLET ORAL 2 TIMES DAILY
Qty: 14 TABLET | Refills: 0 | Status: SHIPPED | OUTPATIENT
Start: 2023-08-03 | End: 2023-08-10

## 2023-09-17 ENCOUNTER — HEALTH MAINTENANCE LETTER (OUTPATIENT)
Age: 43
End: 2023-09-17

## 2024-07-25 ENCOUNTER — HOSPITAL ENCOUNTER (OUTPATIENT)
Dept: MAMMOGRAPHY | Facility: CLINIC | Age: 44
Discharge: HOME | End: 2024-07-25
Attending: FAMILY MEDICINE | Admitting: FAMILY MEDICINE
Payer: COMMERCIAL

## 2024-07-25 DIAGNOSIS — Z12.31 VISIT FOR SCREENING MAMMOGRAM: ICD-10-CM

## 2024-07-25 PROCEDURE — 77067 SCR MAMMO BI INCL CAD: CPT

## 2024-07-26 ENCOUNTER — PATIENT OUTREACH (OUTPATIENT)
Dept: CARE COORDINATION | Facility: CLINIC | Age: 44
End: 2024-07-26
Payer: COMMERCIAL

## 2024-09-04 ASSESSMENT — SOCIAL DETERMINANTS OF HEALTH (SDOH): HOW OFTEN DO YOU GET TOGETHER WITH FRIENDS OR RELATIVES?: ONCE A WEEK

## 2024-09-05 ENCOUNTER — ORDERS ONLY (AUTO-RELEASED) (OUTPATIENT)
Dept: FAMILY MEDICINE | Facility: CLINIC | Age: 44
End: 2024-09-05

## 2024-09-05 ENCOUNTER — OFFICE VISIT (OUTPATIENT)
Dept: FAMILY MEDICINE | Facility: CLINIC | Age: 44
End: 2024-09-05
Payer: COMMERCIAL

## 2024-09-05 VITALS
OXYGEN SATURATION: 100 % | DIASTOLIC BLOOD PRESSURE: 71 MMHG | SYSTOLIC BLOOD PRESSURE: 116 MMHG | WEIGHT: 154.6 LBS | HEART RATE: 83 BPM | HEIGHT: 65 IN | RESPIRATION RATE: 16 BRPM | TEMPERATURE: 98 F | BODY MASS INDEX: 25.76 KG/M2

## 2024-09-05 DIAGNOSIS — Z12.11 SCREEN FOR COLON CANCER: ICD-10-CM

## 2024-09-05 DIAGNOSIS — E28.319 EARLY MENOPAUSE OCCURRING IN PATIENT AGE YOUNGER THAN 45 YEARS: ICD-10-CM

## 2024-09-05 DIAGNOSIS — Z13.220 LIPID SCREENING: ICD-10-CM

## 2024-09-05 DIAGNOSIS — Z80.3 FAMILY HISTORY OF MALIGNANT NEOPLASM OF BREAST: ICD-10-CM

## 2024-09-05 DIAGNOSIS — Z00.00 ROUTINE GENERAL MEDICAL EXAMINATION AT A HEALTH CARE FACILITY: Primary | ICD-10-CM

## 2024-09-05 DIAGNOSIS — N84.1 CERVICAL POLYP: ICD-10-CM

## 2024-09-05 PROBLEM — N76.0 BACTERIAL VAGINOSIS: Status: RESOLVED | Noted: 2023-08-03 | Resolved: 2024-09-05

## 2024-09-05 PROBLEM — B96.89 BACTERIAL VAGINOSIS: Status: RESOLVED | Noted: 2023-08-03 | Resolved: 2024-09-05

## 2024-09-05 PROCEDURE — 90656 IIV3 VACC NO PRSV 0.5 ML IM: CPT | Performed by: FAMILY MEDICINE

## 2024-09-05 PROCEDURE — 99396 PREV VISIT EST AGE 40-64: CPT | Mod: 25 | Performed by: FAMILY MEDICINE

## 2024-09-05 PROCEDURE — 90471 IMMUNIZATION ADMIN: CPT | Performed by: FAMILY MEDICINE

## 2024-09-05 PROCEDURE — 99214 OFFICE O/P EST MOD 30 MIN: CPT | Mod: 25 | Performed by: FAMILY MEDICINE

## 2024-09-05 ASSESSMENT — ANXIETY QUESTIONNAIRES
GAD7 TOTAL SCORE: 1
8. IF YOU CHECKED OFF ANY PROBLEMS, HOW DIFFICULT HAVE THESE MADE IT FOR YOU TO DO YOUR WORK, TAKE CARE OF THINGS AT HOME, OR GET ALONG WITH OTHER PEOPLE?: NOT DIFFICULT AT ALL
GAD7 TOTAL SCORE: 1
7. FEELING AFRAID AS IF SOMETHING AWFUL MIGHT HAPPEN: NOT AT ALL
GAD7 TOTAL SCORE: 1

## 2024-09-05 ASSESSMENT — PATIENT HEALTH QUESTIONNAIRE - PHQ9
SUM OF ALL RESPONSES TO PHQ QUESTIONS 1-9: 2
10. IF YOU CHECKED OFF ANY PROBLEMS, HOW DIFFICULT HAVE THESE PROBLEMS MADE IT FOR YOU TO DO YOUR WORK, TAKE CARE OF THINGS AT HOME, OR GET ALONG WITH OTHER PEOPLE: SOMEWHAT DIFFICULT
SUM OF ALL RESPONSES TO PHQ QUESTIONS 1-9: 2

## 2024-09-05 NOTE — PATIENT INSTRUCTIONS
Patient Education   Preventive Care Advice   This is general advice given by our system to help you stay healthy. However, your care team may have specific advice just for you. Please talk to your care team about your preventive care needs.  Nutrition  Eat 5 or more servings of fruits and vegetables each day.  Try wheat bread, brown rice and whole grain pasta (instead of white bread, rice, and pasta).  Get enough calcium and vitamin D. Check the label on foods and aim for 100% of the RDA (recommended daily allowance).  Lifestyle  Exercise at least 150 minutes each week  (30 minutes a day, 5 days a week).  Do muscle strengthening activities 2 days a week. These help control your weight and prevent disease.  No smoking.  Wear sunscreen to prevent skin cancer.  Have a dental exam and cleaning every 6 months.  Yearly exams  See your health care team every year to talk about:  Any changes in your health.  Any medicines your care team has prescribed.  Preventive care, family planning, and ways to prevent chronic diseases.  Shots (vaccines)   HPV shots (up to age 26), if you've never had them before.  Hepatitis B shots (up to age 59), if you've never had them before.  COVID-19 shot: Get this shot when it's due.  Flu shot: Get a flu shot every year.  Tetanus shot: Get a tetanus shot every 10 years.  Pneumococcal, hepatitis A, and RSV shots: Ask your care team if you need these based on your risk.  Shingles shot (for age 50 and up)  General health tests  Diabetes screening:  Starting at age 35, Get screened for diabetes at least every 3 years.  If you are younger than age 35, ask your care team if you should be screened for diabetes.  Cholesterol test: At age 39, start having a cholesterol test every 5 years, or more often if advised.  Bone density scan (DEXA): At age 50, ask your care team if you should have this scan for osteoporosis (brittle bones).  Hepatitis C: Get tested at least once in your life.  STIs (sexually  transmitted infections)  Before age 24: Ask your care team if you should be screened for STIs.  After age 24: Get screened for STIs if you're at risk. You are at risk for STIs (including HIV) if:  You are sexually active with more than one person.  You don't use condoms every time.  You or a partner was diagnosed with a sexually transmitted infection.  If you are at risk for HIV, ask about PrEP medicine to prevent HIV.  Get tested for HIV at least once in your life, whether you are at risk for HIV or not.  Cancer screening tests  Cervical cancer screening: If you have a cervix, begin getting regular cervical cancer screening tests starting at age 21.  Breast cancer scan (mammogram): If you've ever had breasts, begin having regular mammograms starting at age 40. This is a scan to check for breast cancer.  Colon cancer screening: It is important to start screening for colon cancer at age 45.  Have a colonoscopy test every 10 years (or more often if you're at risk) Or, ask your provider about stool tests like a FIT test every year or Cologuard test every 3 years.  To learn more about your testing options, visit:   .  For help making a decision, visit:   https://bit.ly/lx72624.  Prostate cancer screening test: If you have a prostate, ask your care team if a prostate cancer screening test (PSA) at age 55 is right for you.  Lung cancer screening: If you are a current or former smoker ages 50 to 80, ask your care team if ongoing lung cancer screenings are right for you.  For informational purposes only. Not to replace the advice of your health care provider. Copyright   2023 [facility]. All rights reserved. Clinically reviewed by the [facility]. [employer] 794703 - REV 01/24.  9 Ways to Cut Back on Drinking  Maybe you've found yourself drinking more alcohol than you'd prefer. If you want to cut back, here are some ideas to try.    Think before you drink.  Do you really want a drink,  "or is it just a habit? If you're used to having a drink at a certain time, try doing something else then.     Look for substitutes.  Find some no-alcohol drinks that you enjoy, like flavored seltzer water, tea with honey, or tonic with a slice of lime. Or try alcohol-free beer or \"virgin\" cocktails (without the alcohol).     Drink more water.  Use water to quench your thirst. Drink a glass of water before you have any alcohol. Have another glass along with every drink or between drinks.     Shrink your drink.  For example, have a bottle of beer instead of a pint. Use a smaller glass for wine. Choose drinks with lower alcohol content (ABV%). Or use less liquor and more mixer in cocktails.     Slow down.  It's easy to drink quickly and without thinking about it. Pay attention, and make each drink last longer.     Do the math.  Total up how much you spend on alcohol each month. How much is that a year? If you cut back, what could you do with the money you save?     Take a break.  Choose a day or two each week when you won't drink at all. Notice how you feel on those days, physically and emotionally. How did you sleep? Do you feel better? Over time, add more break days.     Count calories.  Would you like to lose some weight? For some people that's a good motivator for cutting back. Figure out how many calories are in each drink. How many does that add up to in a day? In a week? In a month?     Practice saying no.  Be ready when someone offers you a drink. Try: \"Thanks, I've had enough.\" Or \"Thanks, but I'm cutting back.\" Or \"No, thanks. I feel better when I drink less.\"   Current as of: November 15, 2023  Content Version: 14.1 2006-2024 [employer].   Care instructions adapted under license by your healthcare professional. If you have questions about a medical condition or this instruction, always ask your healthcare professional. [employer] disclaims any warranty or liability for your use " of this information.

## 2024-09-05 NOTE — PROGRESS NOTES
Preventive Care Visit  [facility]  Summer Bustillo MD, Family Medicine  Sep 5, 2024      Assessment & Plan     Routine general medical examination at a health care facility  Routine history and physical, updated in EMR.  Labs ordered, patient will return fasting for a lab visit.  Mammogram is up to date, as is pap smear.  Flu shot given today.  After a discussion of options, Cologuard ordered for colon cancer screening.  Plan repeat physical in 1 year.  - Comprehensive metabolic panel (BMP + Alb, Alk Phos, ALT, AST, Total. Bili, TP); Future  - Lipid panel reflex to direct LDL Fasting; Future  - CBC with platelets; Future  - TSH with free T4 reflex; Future    Cervical polyp  This remains, although barely protruding from the external cervical os.  Recommended Ob/Gyn referral for removal and sampling.  - Ob/Gyn  Referral; Future    Family history of malignant neoplasm of breast  Patient is interested in seeing if she qualifies for high risk screening given her family history.  Referral placed to genetic counselor.  - Adult Oncology/Hematology  Referral; Future    Early menopause occurring in patient age younger than 45 years  Plan recheck FSH along with an estradiol level with upcoming labs.  Thyroid and prolactin normal previously.  Minimal hot flashes.  - Comprehensive metabolic panel (BMP + Alb, Alk Phos, ALT, AST, Total. Bili, TP); Future  - CBC with platelets; Future  - TSH with free T4 reflex; Future  - Estradiol; Future  - Follicle stimulating hormone; Future    Screen for colon cancer  After a discussion of available options, patient wishes to try Cologuard for colon cancer screening.  Order placed today.  - COLOGUARD([employer]); Future    Lipid screening  Patient to return fasting for lipid profile.  - Lipid panel reflex to direct LDL Fasting; Future            BMI  Estimated body mass index is 25.91 kg/m  as calculated from the following:    Height as of  "this encounter: 1.645 m (5' 4.76\").    Weight as of this encounter: 70.1 kg (154 lb 9.6 oz).   Weight management plan: Discussed healthy diet and exercise guidelines    Counseling  Appropriate preventive services were addressed with this patient via screening, questionnaire, or discussion as appropriate for fall prevention, nutrition, physical activity, Tobacco-use cessation, social engagement, weight loss and cognition.  Checklist reviewing preventive services available has been given to the patient.  Reviewed patient's diet, addressing concerns and/or questions.   She is at risk for lack of exercise and has been provided with information to increase physical activity for the benefit of her well-being.   The patient was instructed to see the dentist every 6 months.   She is at risk for psychosocial distress and has been provided with information to reduce risk.   The patient reports drinking more than 3 alcoholic drinks per day and/or more than 7 drhnks per week. The patient was counseled and given information about possible harmful effects of excessive alcohol intake.        Subjective   Charter is a 44 year old, presenting for the following:  Physical        9/5/2024    12:08 PM   Additional Questions   Roomed by Jessika MELLO LPN        Health Care Directive  Patient does not have a Health Care Directive or Living Will: Discussed advance care planning with patient; however, patient declined at this time.    HPI    Wondering about hormone levels.  Thinks mother went through menopause a bit early.  LMP in 2022 she thinks.  Occasional hot flashes, brief.  Typically 1-2 times weekly over the past year or so.  Never had cervical polyp removed.  Saw a midwife after this was diagnosed after being referred to Ob/Gyn.  Having some chest tightness, worse with deep breathing.  Happens a few times weekly.  Happens typically at rest.  No association with food intake that she has noticed.   Has rare palpitations, not associated with " the pressure.            9/4/2024   General Health   How would you rate your overall physical health? Good   Feel stress (tense, anxious, or unable to sleep) Only a little      (!) STRESS CONCERN      9/4/2024   Nutrition   Three or more servings of calcium each day? Yes   Diet: Regular (no restrictions)   How many servings of fruit and vegetables per day? (!) 2-3   How many sweetened beverages each day? (!) 2            9/4/2024   Exercise   Days per week of moderate/strenous exercise 1 day   Average minutes spent exercising at this level 30 min      (!) EXERCISE CONCERN      9/4/2024   Social Factors   Frequency of gathering with friends or relatives Once a week   Worry food won't last until get money to buy more No   Food not last or not have enough money for food? No   Do you have housing? (Housing is defined as stable permanent housing and does not include staying ouside in a car, in a tent, in an abandoned building, in an overnight shelter, or couch-surfing.) Yes   Are you worried about losing your housing? No   Lack of transportation? No   Unable to get utilities (heat,electricity)? No            9/4/2024   Dental   Dentist two times every year? (!) NO            9/4/2024   TB Screening   Were you born outside of the [place]? No          Today's PHQ-9 Score:       9/5/2024    11:58 AM   PHQ-9 SCORE   PHQ-9 Total Score MyChart 2 (Minimal depression)   PHQ-9 Total Score 2         9/4/2024   Substance Use   Alcohol more than 3/day or more than 7/wk Yes   How often do you have a drink containing alcohol 4 or more times a week   How many alcohol drinks on typical day 1 or 2   How often do you have 5+ drinks at one occasion Never   Audit 2/3 Score 0   How often not able to stop drinking once started Never   How often failed to do what normally expected Never   How often needed first drink in am after a heavy drinking session Never   How often feeling of guilt or remorse after drinking Never   How often unable to  remember what happened the night before Never   Have you or someone else been injured because of your drinking No   Has anyone been concerned or suggested you cut down on drinking No   TOTAL SCORE - AUDIT 4   Do you use any other substances recreationally? No        Social History     Tobacco Use    Smoking status: Never     Passive exposure: Never    Smokeless tobacco: Never   Vaping Use    Vaping status: Never Used   Substance Use Topics    Alcohol use: Yes    Drug use: Never           7/25/2024   LAST FHS-7 RESULTS   1st degree relative breast or ovarian cancer No   Any relative bilateral breast cancer No   Any male have breast cancer No   Any woman with breast cancer before 50yrs No   2 or more relatives with breast AND/OR ovarian cancer Yes   2 or more relatives with breast AND/OR bowel cancer Yes          Mammogram Screening - Mammogram every 1-2 years updated in Health Maintenance based on mutual decision making        9/4/2024   STI Screening   New sexual partner(s) since last STI/HIV test? No        History of abnormal Pap smear: No - age 30- 64 PAP with HPV every 5 years recommended        Latest Ref Rng & Units 8/17/2022    12:29 PM 1/31/2018     9:19 AM   PAP / HPV   PAP  Negative for Intraepithelial Lesion or Malignancy (NILM)  Negative for squamous intraepithelial lesion or malignancy  Electronically signed by Angélica Chapin CT (ASCP) on 2/6/2018 at  4:11 PM      HPV 16 DNA Negative Negative  Negative    HPV 18 DNA Negative Negative  Negative    Other HR HPV Negative Negative  Negative      ASCVD Risk   The 10-year ASCVD risk score (Nba HALL, et al., 2019) is: 0.4%    Values used to calculate the score:      Age: 44 years      Sex: Female      Is Non- : No      Diabetic: No      Tobacco smoker: No      Systolic Blood Pressure: 116 mmHg      Is BP treated: No      HDL Cholesterol: 86 mg/dL      Total Cholesterol: 208 mg/dL        9/4/2024   Contraception/Family  Planning   Questions about contraception or family planning No           Reviewed and updated as needed this visit by Provider   Tobacco  Allergies  Meds  Problems  Med Hx  Surg Hx  Fam Hx  Soc   Hx Sexual Activity          Past Medical History:   Diagnosis Date    Cervical polyp 2019    Inverted nipple all life.    right    Rectal fissure 2019     Past Surgical History:   Procedure Laterality Date    BREAST SURGERY  2017    bilateral augmentation; silicone    COSMETIC SURGERY  2017    Rhinoplasty    HC CORRECT BUNION, W/ OR WO SESAMOIDECTOMY,LAPIDUS TYPE      Description: Bunion Correction By Lapidus Procedure;  Proc Date: 2013;  Comments: R Lapidus bunionectomy, [employer] hardware    HC CORRECT BUNION,METATARSAL OSTEOTOMY      Description: Bunion Correction With Metatarsal Osteotomy;  Recorded: 2013;  Comments: '12 (left)    MAMMOPLASTY AUGMENTATION Bilateral 2016     OB History    Para Term  AB Living   2 2 2 0 0 0   SAB IAB Ectopic Multiple Live Births   0 0 0 0 0      # Outcome Date GA Lbr Td/2nd Weight Sex Type Anes PTL Lv   2 Term      Vag-Spont      1 Term      Vag-Spont        BP Readings from Last 3 Encounters:   24 116/71   23 90/66   23 103/71    Wt Readings from Last 3 Encounters:   24 70.1 kg (154 lb 9.6 oz)   23 68.4 kg (150 lb 12.8 oz)   23 67 kg (147 lb 12.8 oz)                  Patient Active Problem List   Diagnosis    Hx of herpes simplex infection    Hx cervical polyp s/p removal    Family history of malignant neoplasm of breast    Early menopause occurring in patient age younger than 45 years     Past Surgical History:   Procedure Laterality Date    BREAST SURGERY  2017    bilateral augmentation; silicone    COSMETIC SURGERY  2017    Rhinoplasty    HC CORRECT BUNION, W/ OR WO SESAMOIDECTOMY,LAPIDUS TYPE      Description: Bunion Correction By Lapidus Procedure;  Proc Date: 2013;   "Comments: R Lapidus bunionectomy, [employer] hardware    HC CORRECT BUNION,METATARSAL OSTEOTOMY      Description: Bunion Correction With Metatarsal Osteotomy;  Recorded: 03/27/2013;  Comments: '12 (left)    MAMMOPLASTY AUGMENTATION Bilateral 01/01/2016       Social History     Tobacco Use    Smoking status: Never     Passive exposure: Never    Smokeless tobacco: Never   Substance Use Topics    Alcohol use: Yes     Alcohol/week: 7.0 standard drinks of alcohol     Types: 7 Glasses of wine per week     Family History   Problem Relation Age of Onset    Brain Cancer Mother         told not hereditary    Hyperlipidemia Father     Substance Abuse Sister     Depression Sister     Substance Abuse Sister     Breast Cancer Paternal Grandmother     Breast Cancer Paternal Aunt 50    Colon Cancer No family hx of          Current Outpatient Medications   Medication Sig Dispense Refill    acyclovir (ZOVIRAX) 400 MG tablet [ACYCLOVIR (ZOVIRAX) 400 MG TABLET] TAKE 1 TABLET BY MOUTH THREE TIMES DAILY (Patient taking differently: Takes as needed) 15 tablet 6    calcium 500 mg Tab [CALCIUM 500 MG TAB] Take 500 mg by mouth.      Multiple Vitamin (MULTIVITAMIN PO) Take by mouth.       Allergies   Allergen Reactions    Erythromycin Rash         Review of Systems  Constitutional, HEENT, cardiovascular, pulmonary, GI, , musculoskeletal, neuro, skin, endocrine and psych systems are negative, except as otherwise noted.     Objective    Exam  /71   Pulse 83   Temp 98  F (36.7  C) (Oral)   Resp 16   Ht 1.645 m (5' 4.76\")   Wt 70.1 kg (154 lb 9.6 oz)   SpO2 100%   BMI 25.91 kg/m     Estimated body mass index is 25.91 kg/m  as calculated from the following:    Height as of this encounter: 1.645 m (5' 4.76\").    Weight as of this encounter: 70.1 kg (154 lb 9.6 oz).    Physical Exam  GENERAL: alert and no distress  EYES: Eyes grossly normal to inspection, PERRL and conjunctivae and sclerae normal  HENT: ear canals and TM's normal, nose " and mouth without ulcers or lesions  NECK: no adenopathy, no asymmetry, masses, or scars  RESP: lungs clear to auscultation - no rales, rhonchi or wheezes  BREAST: normal without masses, tenderness or nipple discharge and no palpable axillary masses or adenopathy; implants bilaterally  CV: regular rate and rhythm, normal S1 S2, no S3 or S4, no murmur, click or rub, no peripheral edema  ABDOMEN: soft, nontender, no hepatosplenomegaly, no masses and bowel sounds normal   (female): normal female external genitalia, normal urethral meatus, normal vaginal mucosa, and normal cervix/adnexa/uterus without masses or discharge; small cervical polyp seen at external cervical os  MS: no gross musculoskeletal defects noted, no edema  SKIN: no suspicious lesions or rashes  NEURO: Normal strength and tone, mentation intact and speech normal  PSYCH: mentation appears normal, affect normal/bright        Signed Electronically by: Summer Bustillo MD

## 2024-09-06 ENCOUNTER — PATIENT OUTREACH (OUTPATIENT)
Dept: ONCOLOGY | Facility: CLINIC | Age: 44
End: 2024-09-06
Payer: COMMERCIAL

## 2024-09-06 PROBLEM — E28.319 EARLY MENOPAUSE OCCURRING IN PATIENT AGE YOUNGER THAN 45 YEARS: Status: ACTIVE | Noted: 2024-09-06

## 2024-09-06 PROBLEM — Z80.3 FAMILY HISTORY OF MALIGNANT NEOPLASM OF BREAST: Status: ACTIVE | Noted: 2024-09-06

## 2024-09-06 NOTE — PROGRESS NOTES
Writer received referral to Cancer Risk Management/Genetic Counseling.    Referred for:    family history of breast cancer; wondering about high-risk screening eligibility    Referred By    Provider Department Location Phone   Summer Bustillo MD [facility] Family Medicine/Ob [facility] [phone]       Referral reviewed for appropriate plan, and sent to New Patient Scheduling ([phone]) for completion.    Dana Bateman RN, BSN  Oncology New Patient Nurse Navigator   [facility]

## 2024-09-12 ENCOUNTER — LAB (OUTPATIENT)
Dept: LAB | Facility: CLINIC | Age: 44
End: 2024-09-12
Payer: COMMERCIAL

## 2024-09-12 DIAGNOSIS — E28.319 EARLY MENOPAUSE OCCURRING IN PATIENT AGE YOUNGER THAN 45 YEARS: ICD-10-CM

## 2024-09-12 DIAGNOSIS — Z13.220 LIPID SCREENING: ICD-10-CM

## 2024-09-12 DIAGNOSIS — Z00.00 ROUTINE GENERAL MEDICAL EXAMINATION AT A HEALTH CARE FACILITY: ICD-10-CM

## 2024-09-12 LAB
ALBUMIN SERPL BCG-MCNC: 4.5 G/DL (ref 3.5–5.2)
ALP SERPL-CCNC: 80 U/L (ref 40–150)
ALT SERPL W P-5'-P-CCNC: 16 U/L (ref 0–50)
ANION GAP SERPL CALCULATED.3IONS-SCNC: 9 MMOL/L (ref 7–15)
AST SERPL W P-5'-P-CCNC: 20 U/L (ref 0–45)
BILIRUB SERPL-MCNC: 0.9 MG/DL
BUN SERPL-MCNC: 12.8 MG/DL (ref 6–20)
CALCIUM SERPL-MCNC: 9.7 MG/DL (ref 8.8–10.4)
CHLORIDE SERPL-SCNC: 102 MMOL/L (ref 98–107)
CHOLEST SERPL-MCNC: 229 MG/DL
CREAT SERPL-MCNC: 0.86 MG/DL (ref 0.51–0.95)
EGFRCR SERPLBLD CKD-EPI 2021: 84 ML/MIN/1.73M2
ERYTHROCYTE [DISTWIDTH] IN BLOOD BY AUTOMATED COUNT: 13 % (ref 10–15)
ESTRADIOL SERPL-MCNC: 15 PG/ML
FASTING STATUS PATIENT QL REPORTED: YES
FASTING STATUS PATIENT QL REPORTED: YES
FSH SERPL IRP2-ACNC: 125 MIU/ML
GLUCOSE SERPL-MCNC: 98 MG/DL (ref 70–99)
HCO3 SERPL-SCNC: 29 MMOL/L (ref 22–29)
HCT VFR BLD AUTO: 42.4 % (ref 35–47)
HDLC SERPL-MCNC: 90 MG/DL
HGB BLD-MCNC: 13.8 G/DL (ref 11.7–15.7)
LDLC SERPL CALC-MCNC: 124 MG/DL
MCH RBC QN AUTO: 30.7 PG (ref 26.5–33)
MCHC RBC AUTO-ENTMCNC: 32.5 G/DL (ref 31.5–36.5)
MCV RBC AUTO: 94 FL (ref 78–100)
NONHDLC SERPL-MCNC: 139 MG/DL
PLATELET # BLD AUTO: 233 10E3/UL (ref 150–450)
POTASSIUM SERPL-SCNC: 4.4 MMOL/L (ref 3.4–5.3)
PROT SERPL-MCNC: 7.6 G/DL (ref 6.4–8.3)
RBC # BLD AUTO: 4.5 10E6/UL (ref 3.8–5.2)
SODIUM SERPL-SCNC: 140 MMOL/L (ref 135–145)
TRIGL SERPL-MCNC: 74 MG/DL
TSH SERPL DL<=0.005 MIU/L-ACNC: 1.28 UIU/ML (ref 0.3–4.2)
WBC # BLD AUTO: 4.7 10E3/UL (ref 4–11)

## 2024-09-12 PROCEDURE — 80053 COMPREHEN METABOLIC PANEL: CPT

## 2024-09-12 PROCEDURE — 82670 ASSAY OF TOTAL ESTRADIOL: CPT

## 2024-09-12 PROCEDURE — 83001 ASSAY OF GONADOTROPIN (FSH): CPT

## 2024-09-12 PROCEDURE — 80061 LIPID PANEL: CPT

## 2024-09-12 PROCEDURE — 36415 COLL VENOUS BLD VENIPUNCTURE: CPT

## 2024-09-12 PROCEDURE — 85027 COMPLETE CBC AUTOMATED: CPT

## 2024-09-12 PROCEDURE — 84443 ASSAY THYROID STIM HORMONE: CPT

## 2024-10-09 LAB — NONINV COLON CA DNA+OCC BLD SCRN STL QL: NEGATIVE

## 2024-11-21 ENCOUNTER — OFFICE VISIT (OUTPATIENT)
Dept: OBGYN | Facility: CLINIC | Age: 44
End: 2024-11-21
Attending: FAMILY MEDICINE
Payer: COMMERCIAL

## 2024-11-21 VITALS
WEIGHT: 156 LBS | DIASTOLIC BLOOD PRESSURE: 68 MMHG | SYSTOLIC BLOOD PRESSURE: 112 MMHG | HEART RATE: 80 BPM | BODY MASS INDEX: 26.15 KG/M2

## 2024-11-21 DIAGNOSIS — N84.1 CERVICAL POLYP: ICD-10-CM

## 2024-11-21 ASSESSMENT — ANXIETY QUESTIONNAIRES
6. BECOMING EASILY ANNOYED OR IRRITABLE: NOT AT ALL
5. BEING SO RESTLESS THAT IT IS HARD TO SIT STILL: NOT AT ALL
GAD7 TOTAL SCORE: 1
8. IF YOU CHECKED OFF ANY PROBLEMS, HOW DIFFICULT HAVE THESE MADE IT FOR YOU TO DO YOUR WORK, TAKE CARE OF THINGS AT HOME, OR GET ALONG WITH OTHER PEOPLE?: NOT DIFFICULT AT ALL
1. FEELING NERVOUS, ANXIOUS, OR ON EDGE: NOT AT ALL
7. FEELING AFRAID AS IF SOMETHING AWFUL MIGHT HAPPEN: SEVERAL DAYS
2. NOT BEING ABLE TO STOP OR CONTROL WORRYING: NOT AT ALL
GAD7 TOTAL SCORE: 1
3. WORRYING TOO MUCH ABOUT DIFFERENT THINGS: NOT AT ALL
7. FEELING AFRAID AS IF SOMETHING AWFUL MIGHT HAPPEN: SEVERAL DAYS
4. TROUBLE RELAXING: NOT AT ALL
GAD7 TOTAL SCORE: 1
IF YOU CHECKED OFF ANY PROBLEMS ON THIS QUESTIONNAIRE, HOW DIFFICULT HAVE THESE PROBLEMS MADE IT FOR YOU TO DO YOUR WORK, TAKE CARE OF THINGS AT HOME, OR GET ALONG WITH OTHER PEOPLE: NOT DIFFICULT AT ALL

## 2024-11-21 NOTE — PROGRESS NOTES
CC: Iza Dukeer is here secondary to a cervical polyp.    HPI: The pt is a 44 year old MWF P2 who presents with a known cervical polyp seen by Dr Bustillo.  She has a history of a polyp removal in 2019.  She has not had a period since Sept of 2022.  At the end of July she had intercourse and then had about 5 days of light spotting afterwards.  She has not had bleeding since.    Past Medical History:   Diagnosis Date    Cervical polyp 09/26/2019    Inverted nipple all life.    right    Rectal fissure 09/26/2019       Past Surgical History:   Procedure Laterality Date    BREAST SURGERY  11/01/2017    bilateral augmentation; silicone    COSMETIC SURGERY  11/2017    Rhinoplasty    HC CORRECT BUNION, W/ OR WO SESAMOIDECTOMY,LAPIDUS TYPE      Description: Bunion Correction By Lapidus Procedure;  Proc Date: 04/05/2013;  Comments: R Lapidus bunionectomy, [employer] hardware    HC CORRECT BUNION,METATARSAL OSTEOTOMY      Description: Bunion Correction With Metatarsal Osteotomy;  Recorded: 03/27/2013;  Comments: '12 (left)    MAMMOPLASTY AUGMENTATION Bilateral 01/01/2016       Patient's   Family History   Problem Relation Age of Onset    Brain Cancer Mother         told not hereditary    Hyperlipidemia Father     Breast Cancer Paternal Grandmother     Substance Abuse Sister     Depression Sister     Substance Abuse Sister     Breast Cancer Paternal Aunt 50    Colon Cancer No family hx of        Patient   Social History     Socioeconomic History    Marital status:      Spouse name: None    Number of children: None    Years of education: None    Highest education level: None   Tobacco Use    Smoking status: Never     Passive exposure: Never    Smokeless tobacco: Never   Vaping Use    Vaping status: Never Used   Substance and Sexual Activity    Alcohol use: Yes     Alcohol/week: 7.0 standard drinks of alcohol     Types: 7 Glasses of wine per week    Drug use: Never    Sexual activity: Yes     Partners: Male     Birth  control/protection: Male Surgical     Comment:  Jessee   Other Topics Concern    Parent/sibling w/ CABG, MI or angioplasty before 65F 55M? No   Social History Narrative     and 2 boys, 8 and 6-year-old. 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    Manuel Crowe MD  9/26/2019         Social Drivers of Health     Financial Resource Strain: Low Risk  (9/4/2024)    Financial Resource Strain     Within the past 12 months, have you or your family members you live with been unable to get utilities (heat, electricity) when it was really needed?: No   Food Insecurity: Low Risk  (9/4/2024)    Food Insecurity     Within the past 12 months, did you worry that your food would run out before you got money to buy more?: No     Within the past 12 months, did the food you bought just not last and you didn t have money to get more?: No   Transportation Needs: Low Risk  (9/4/2024)    Transportation Needs     Within the past 12 months, has lack of transportation kept you from medical appointments, getting your medicines, non-medical meetings or appointments, work, or from getting things that you need?: No   Physical Activity: Insufficiently Active (9/4/2024)    Exercise Vital Sign     Days of Exercise per Week: 1 day     Minutes of Exercise per Session: 30 min   Stress: No Stress Concern Present (9/4/2024)    [employer] [employer] - Occupational Stress Questionnaire     Feeling of Stress : Only a little   Social Connections: Unknown (9/4/2024)    Social Connection and Isolation Panel [NHANES]     Frequency of Social Gatherings with Friends and Family: Once a week   Housing Stability: Low Risk  (9/4/2024)    Housing Stability     Do you have housing? : Yes     Are you worried about losing your housing?: No       Outpatient Medications Prior to Visit   Medication Sig Dispense Refill     acyclovir (ZOVIRAX) 400 MG tablet [ACYCLOVIR (ZOVIRAX) 400 MG TABLET] TAKE 1 TABLET BY MOUTH THREE TIMES DAILY (Patient taking differently: Takes as needed) 15 tablet 6    calcium 500 mg Tab [CALCIUM 500 MG TAB] Take 500 mg by mouth.      Multiple Vitamin (MULTIVITAMIN PO) Take by mouth.       No facility-administered medications prior to visit.       Patient is allergic to erythromycin.    ROS:  12 part ROS is negative aside from those symptoms in the HPI    PE:  /68 (BP Location: Right arm, Patient Position: Sitting, Cuff Size: Adult Regular)   Pulse 80   Wt 70.8 kg (156 lb)           Body mass index is 26.15 kg/m .    General: overweight WF, NAD  Pelvic: EG/BUS no lesions, no skin change   Vagina no lesions, no discharge   Cervix no cervical motion tenderness, 2 small polyps seen with attachment at 4:00; grasped with ring forceps and then ernesto clamp with torsion used to remove them   Perineum no lesions   Rectal deferred  Psych: normal mood  Neuro: CN I-XII grossly intact  MS: normal gait    Assessment: 44 year old MWF P2 with successful cervical polypectomy    Plan: Natural history of polyps discussed with the patient.  She will be notified when results are back.  We discussed that since she has now had 2 polyps, she is at increased chance of having them recur.  Questions were answered to the best of my ability.      Answers submitted by the patient for this visit:  Patient Health Questionnaire (G7) (Submitted on 11/21/2024)  DAVID 7 TOTAL SCORE: 1

## 2024-11-25 LAB
PATH REPORT.COMMENTS IMP SPEC: NORMAL
PATH REPORT.COMMENTS IMP SPEC: NORMAL
PATH REPORT.FINAL DX SPEC: NORMAL
PATH REPORT.GROSS SPEC: NORMAL
PATH REPORT.MICROSCOPIC SPEC OTHER STN: NORMAL
PATH REPORT.RELEVANT HX SPEC: NORMAL
PHOTO IMAGE: NORMAL

## 2025-02-09 ENCOUNTER — NURSE TRIAGE (OUTPATIENT)
Dept: NURSING | Facility: CLINIC | Age: 45
End: 2025-02-09
Payer: COMMERCIAL

## 2025-02-09 NOTE — TELEPHONE ENCOUNTER
Nurse Triage SBAR    Is this a 2nd Level Triage? NO    Situation: sore throat, dry cough    Background: Tested negative for Strep, Influenza, Covid    Assessment: Severe sore throat, dry cough, swollen glands, denies fever/dyspnea.    Protocol Recommended Disposition:   See PCP Within 24 Hours    Recommendation: Patient verbalizes understanding of care advice and agrees with plan.    Renea Batista RN  [facility]     Reason for Disposition   SEVERE (e.g., excruciating) throat pain    Additional Information   Negative: SEVERE difficulty breathing (e.g., struggling for each breath, speaks in single words)   Negative: Bluish (or gray) lips or face now   Negative: [1] Rapid onset of cough AND [2] has hives   Negative: Coughing started suddenly after medicine, an allergic food or bee sting   Negative: [1] Difficulty breathing AND [2] exposure to flames, smoke, or fumes   Negative: [1] Stridor AND [2] difficulty breathing   Negative: Sounds like a life-threatening emergency to the triager   Negative: [1] MODERATE difficulty breathing (e.g., speaks in phrases, SOB even at rest, pulse 100-120) AND [2] still present when not coughing   Negative: Chest pain  (Exception: MILD central chest pain, present only when coughing.)   Negative: Patient sounds very sick or weak to the triager   Negative: [1] MILD difficulty breathing (e.g., minimal/no SOB at rest, SOB with walking, pulse <100) AND [2] still present when not coughing   Negative: [1] Coughed up blood AND [2] > 1 tablespoon (15 ml)   (Exception: Blood-tinged sputum.)   Negative: Fever > 103 F (39.4 C)   Negative: [1] Fever > 101 F (38.3 C) AND [2] age > 60 years   Negative: [1] Fever > 100.0 F (37.8 C) AND [2] bedridden (e.g., CVA, chronic illness, recovering from surgery)   Negative: [1] Fever > 100.0 F (37.8 C) AND [2] diabetes mellitus or weak immune system (e.g., HIV positive, cancer chemo, splenectomy, organ transplant, chronic steroids)   Negative:  Wheezing is present   Negative: [1] Ankle swelling AND [2] swelling is increasing   Negative: SEVERE difficulty breathing (e.g., struggling for each breath, speaks in single words, stridor)   Negative: Sounds like a life-threatening emergency to the triager   Negative: [1] Drooling or spitting out saliva (because can't swallow) AND [2] normal breathing   Negative: Unable to open mouth completely   Negative: [1] Difficulty breathing AND [2] not severe   Negative: Fever > 104 F (40 C)   Negative: [1] Refuses to drink anything AND [2] for > 12 hours   Negative: [1] Drinking very little AND [2] dehydration suspected (e.g., no urine > 12 hours, very dry mouth, very lightheaded)   Negative: Patient sounds very sick or weak to the triager    Protocols used: Cough - Acute Non-Productive-A-AH, Sore Throat-A-AH

## 2025-03-03 ENCOUNTER — LAB (OUTPATIENT)
Dept: LAB | Facility: CLINIC | Age: 45
End: 2025-03-03
Payer: COMMERCIAL

## 2025-03-03 DIAGNOSIS — Z80.42 FAMILY HISTORY OF MALIGNANT NEOPLASM OF PROSTATE: ICD-10-CM

## 2025-03-03 DIAGNOSIS — Z80.3 FAMILY HISTORY OF MALIGNANT NEOPLASM OF BREAST: ICD-10-CM

## 2025-03-03 PROCEDURE — 36415 COLL VENOUS BLD VENIPUNCTURE: CPT

## 2025-03-03 PROCEDURE — 99000 SPECIMEN HANDLING OFFICE-LAB: CPT

## 2025-03-04 LAB
Lab: 1101
PERFORMING LABORATORY: NORMAL
SPECIMEN STATUS: NORMAL
TEST NAME: NORMAL

## 2025-03-10 LAB
SCANNED LAB RESULT: NORMAL
TEST NAME: NORMAL

## 2025-03-11 DIAGNOSIS — Z80.42 FAMILY HISTORY OF MALIGNANT NEOPLASM OF PROSTATE: ICD-10-CM

## 2025-03-11 DIAGNOSIS — Z80.3 FAMILY HISTORY OF MALIGNANT NEOPLASM OF BREAST: Primary | ICD-10-CM
